# Patient Record
Sex: FEMALE | Race: WHITE | Employment: FULL TIME | URBAN - NONMETROPOLITAN AREA
[De-identification: names, ages, dates, MRNs, and addresses within clinical notes are randomized per-mention and may not be internally consistent; named-entity substitution may affect disease eponyms.]

---

## 2018-03-01 ENCOUNTER — HOSPITAL ENCOUNTER (OUTPATIENT)
Dept: WOMENS IMAGING | Age: 43
Discharge: HOME | End: 2018-03-01
Payer: COMMERCIAL

## 2018-03-01 DIAGNOSIS — Z12.31 VISIT FOR SCREENING MAMMOGRAM: ICD-10-CM

## 2018-03-01 PROCEDURE — 77063 BREAST TOMOSYNTHESIS BI: CPT

## 2018-05-22 ENCOUNTER — OFFICE VISIT (OUTPATIENT)
Dept: PULMONOLOGY | Age: 43
End: 2018-05-22
Payer: COMMERCIAL

## 2018-05-22 VITALS
BODY MASS INDEX: 40.05 KG/M2 | DIASTOLIC BLOOD PRESSURE: 86 MMHG | OXYGEN SATURATION: 98 % | SYSTOLIC BLOOD PRESSURE: 124 MMHG | HEART RATE: 87 BPM | WEIGHT: 249.2 LBS | HEIGHT: 66 IN

## 2018-05-22 DIAGNOSIS — Z99.89 OBSTRUCTIVE SLEEP APNEA ON CPAP: Primary | ICD-10-CM

## 2018-05-22 DIAGNOSIS — G47.33 OBSTRUCTIVE SLEEP APNEA ON CPAP: Primary | ICD-10-CM

## 2018-05-22 PROCEDURE — 99213 OFFICE O/P EST LOW 20 MIN: CPT | Performed by: PHYSICIAN ASSISTANT

## 2018-05-22 ASSESSMENT — ENCOUNTER SYMPTOMS
WHEEZING: 0
HEARTBURN: 0
ORTHOPNEA: 0
SORE THROAT: 0
SPUTUM PRODUCTION: 0
COUGH: 0
SINUS PAIN: 0
NAUSEA: 0
SHORTNESS OF BREATH: 0
GASTROINTESTINAL NEGATIVE: 1
RESPIRATORY NEGATIVE: 1
EYES NEGATIVE: 1

## 2019-07-19 ENCOUNTER — OFFICE VISIT (OUTPATIENT)
Dept: PULMONOLOGY | Age: 44
End: 2019-07-19
Payer: COMMERCIAL

## 2019-07-19 VITALS
DIASTOLIC BLOOD PRESSURE: 88 MMHG | SYSTOLIC BLOOD PRESSURE: 138 MMHG | RESPIRATION RATE: 16 BRPM | BODY MASS INDEX: 41.91 KG/M2 | WEIGHT: 260.8 LBS | OXYGEN SATURATION: 99 % | HEART RATE: 88 BPM | HEIGHT: 66 IN

## 2019-07-19 DIAGNOSIS — E66.9 OBESITY (BMI 30.0-34.9): ICD-10-CM

## 2019-07-19 DIAGNOSIS — G47.33 OBSTRUCTIVE SLEEP APNEA ON CPAP: Primary | ICD-10-CM

## 2019-07-19 DIAGNOSIS — Z99.89 OBSTRUCTIVE SLEEP APNEA ON CPAP: Primary | ICD-10-CM

## 2019-07-19 PROCEDURE — 99213 OFFICE O/P EST LOW 20 MIN: CPT | Performed by: PHYSICIAN ASSISTANT

## 2019-07-19 RX ORDER — OMEPRAZOLE 20 MG/1
20 CAPSULE, DELAYED RELEASE ORAL DAILY
COMMUNITY

## 2019-07-19 ASSESSMENT — ENCOUNTER SYMPTOMS
EYES NEGATIVE: 1
COUGH: 0
CHEST TIGHTNESS: 0
STRIDOR: 0
WHEEZING: 0
ALLERGIC/IMMUNOLOGIC NEGATIVE: 1
NAUSEA: 0
SHORTNESS OF BREATH: 0
DIARRHEA: 0
BACK PAIN: 0

## 2019-07-19 NOTE — PROGRESS NOTES
 Smokeless tobacco: Never Used   Substance Use Topics    Alcohol use: Yes     Alcohol/week: 0.0 standard drinks     Comment: social    Drug use: No       Allergies   Allergen Reactions    Molds & Smuts      Through allergy testing    Penicillins Other (See Comments)     Unsure of reaction-? Really allergic       Current Outpatient Medications   Medication Sig Dispense Refill    omeprazole (PRILOSEC) 20 MG delayed release capsule Take 20 mg by mouth daily      Rosuvastatin Calcium (CRESTOR PO) Take by mouth      levothyroxine (SYNTHROID) 125 MCG tablet Take 125 mcg by mouth Daily      fluticasone-salmeterol (ADVAIR DISKUS) 250-50 MCG/DOSE AEPB Inhale 1 puff into the lungs daily as needed.  Multiple Vitamins-Minerals (WOMENS ONE DAILY PO) Take 1 tablet by mouth daily.  norgestimate-ethinyl estradiol (SPRINTEC 28) 0.25-35 MG-MCG per tablet Take 1 tablet by mouth daily.  metformin (GLUCOPHAGE) 500 MG tablet Take 1,000 mg by mouth 2 times daily (with meals).  ferrous sulfate 325 (65 FE) MG tablet Take 325 mg by mouth daily (with breakfast).  Fe-Succ-C-Gqeg-A55-Prb Stomach (MULTIGEN PO) Take 1 tablet by mouth daily.  FLUoxetine (PROZAC) 20 MG capsule Take 20 mg by mouth daily.  Omega-3 Fatty Acids (FISH OIL) 1200 MG CAPS Take 1 tablet by mouth daily.  folic acid (FOLVITE) 155 MCG tablet Take 400 mcg by mouth daily.  ValACYclovir HCl (VALTREX PO) Take 1 tablet by mouth daily as needed.  Albuterol Sulfate (PROAIR HFA IN) Inhale 2 puffs into the lungs daily as needed. No current facility-administered medications for this visit.         Family History   Problem Relation Age of Onset    High Blood Pressure Father     High Cholesterol Father     Cancer Paternal Uncle         pancreatic    Diabetes Maternal Grandmother     Cancer Paternal Grandfather         pancreatic    Asthma Neg Hx     Kidney Disease Neg Hx         Review of Systems -   Review of Judgment and thought content normal.         ASSESSMENT/DIAGNOSIS     Diagnosis Orders   1. Obstructive sleep apnea on CPAP  DME Order for CPAP as OP    DME Order for Oral Appliance as OP   2. Obesity (BMI 30.0-34. 9)              Plan   Do you need any equipment today? Yes update supplies  - Refer for OA also to use when traveling  - She  was advised to continue current positive airway pressure therapy with above described pressure. - She  advised to keep goodcompliance with current recommended pressure to get optimal results and clinical improvement  - Recommend 7-9 hours of sleep with PAP  - She was advised to call DME company regarding supplies if needed.   -She call my office for earlier appointment if needed for worsening of sleep symptoms.   - She was instructed on weight loss  - Vitor Maloney was educated about my impression and plan. Patient verbalizesunderstanding.   We will see Josetta Leyden back in: 1 year with download    Information added by my medical assistant/LPN was reviewed today         Armando Mccord PA-C, MPAS  7/19/2019

## 2020-02-18 ENCOUNTER — HOSPITAL ENCOUNTER (OUTPATIENT)
Dept: WOMENS IMAGING | Age: 45
Discharge: HOME | End: 2020-02-18
Payer: COMMERCIAL

## 2020-02-18 PROCEDURE — 77063 BREAST TOMOSYNTHESIS BI: CPT

## 2020-02-26 ENCOUNTER — HOSPITAL ENCOUNTER (OUTPATIENT)
Dept: WOMENS IMAGING | Age: 45
Discharge: HOME | End: 2020-02-26
Payer: COMMERCIAL

## 2020-02-26 PROCEDURE — G0279 TOMOSYNTHESIS, MAMMO: HCPCS

## 2020-02-26 PROCEDURE — 76642 ULTRASOUND BREAST LIMITED: CPT

## 2020-02-27 ENCOUNTER — HOSPITAL ENCOUNTER (OUTPATIENT)
Dept: WOMENS IMAGING | Age: 45
Discharge: HOME | End: 2020-02-27
Payer: COMMERCIAL

## 2020-02-27 PROCEDURE — G0279 TOMOSYNTHESIS, MAMMO: HCPCS

## 2020-02-27 PROCEDURE — C1894 INTRO/SHEATH, NON-LASER: HCPCS

## 2020-02-27 PROCEDURE — 88305 TISSUE EXAM BY PATHOLOGIST: CPT

## 2020-02-27 PROCEDURE — 2709999900 HC NON-CHARGEABLE SUPPLY

## 2020-02-27 PROCEDURE — 19083 BX BREAST 1ST LESION US IMAG: CPT

## 2020-02-27 PROCEDURE — 76942 ECHO GUIDE FOR BIOPSY: CPT

## 2020-02-27 PROCEDURE — A4648 IMPLANTABLE TISSUE MARKER: HCPCS

## 2020-02-27 NOTE — PROGRESS NOTES
Formulation and discussion of sedation / procedure plans, risks, benefits, side effects and alternatives with patient and/or responsible adult completed.     Electronically signed by Keke Warner MD on 2/27/2020 at 1:06 PM

## 2020-07-17 ENCOUNTER — OFFICE VISIT (OUTPATIENT)
Dept: PULMONOLOGY | Age: 45
End: 2020-07-17
Payer: COMMERCIAL

## 2020-07-17 VITALS
DIASTOLIC BLOOD PRESSURE: 76 MMHG | SYSTOLIC BLOOD PRESSURE: 132 MMHG | BODY MASS INDEX: 43.23 KG/M2 | TEMPERATURE: 98.2 F | WEIGHT: 269 LBS | HEIGHT: 66 IN | HEART RATE: 77 BPM | OXYGEN SATURATION: 99 %

## 2020-07-17 PROCEDURE — 99213 OFFICE O/P EST LOW 20 MIN: CPT | Performed by: PHYSICIAN ASSISTANT

## 2020-07-17 ASSESSMENT — ENCOUNTER SYMPTOMS
BACK PAIN: 0
NAUSEA: 0
DIARRHEA: 0
COUGH: 0
SHORTNESS OF BREATH: 0
EYES NEGATIVE: 1
STRIDOR: 0
CHEST TIGHTNESS: 0
ALLERGIC/IMMUNOLOGIC NEGATIVE: 1
WHEEZING: 0

## 2020-09-14 ENCOUNTER — HOSPITAL ENCOUNTER (OUTPATIENT)
Dept: WOMENS IMAGING | Age: 45
Discharge: HOME | End: 2020-09-14
Payer: COMMERCIAL

## 2020-09-14 PROCEDURE — G0279 TOMOSYNTHESIS, MAMMO: HCPCS

## 2020-09-14 PROCEDURE — 76642 ULTRASOUND BREAST LIMITED: CPT

## 2020-10-30 ENCOUNTER — OFFICE VISIT (OUTPATIENT)
Dept: PULMONOLOGY | Age: 45
End: 2020-10-30
Payer: COMMERCIAL

## 2020-10-30 VITALS
BODY MASS INDEX: 41.75 KG/M2 | OXYGEN SATURATION: 99 % | SYSTOLIC BLOOD PRESSURE: 128 MMHG | DIASTOLIC BLOOD PRESSURE: 82 MMHG | WEIGHT: 259.8 LBS | HEIGHT: 66 IN | HEART RATE: 90 BPM

## 2020-10-30 PROCEDURE — 99213 OFFICE O/P EST LOW 20 MIN: CPT | Performed by: PHYSICIAN ASSISTANT

## 2020-10-30 ASSESSMENT — ENCOUNTER SYMPTOMS
EYES NEGATIVE: 1
ALLERGIC/IMMUNOLOGIC NEGATIVE: 1
SHORTNESS OF BREATH: 0
DIARRHEA: 0
CHEST TIGHTNESS: 0
NAUSEA: 0
WHEEZING: 0
COUGH: 0
STRIDOR: 0
BACK PAIN: 0

## 2020-10-30 NOTE — PROGRESS NOTES
[facility]      Memo Garcia         [id number]  10/30/2020   Chief Complaint   Patient presents with    Follow-up     GROVER 3 month follow up with a download        Pt of Dr. Claudy SEBASTIAN Download:   Original or initial AHI: 12     Date of initial study: 10/29/2013      Compliant  100%     Noncompliant 0 %     PAP Type CPAP Level  13   Avg Hrs/Day 8 hr 0 min  AHI: 0.8   Recorded compliance dates ,   9/28/20-10/27/20   Machine/Mfg:   [x] ResMed    [] Respironics/Dreamstation   Interface:   [] Nasal    [] Nasal pillows   [x] FFM      Provider:      [] [facility]     [x][facility]     [] [employer]    [] Evaline Colder    [] [facility]               [] [facility]      [] Adaptive    [] [address]      [] Other    Neck Size: 15  Mallampati Mallampati 3  ESS:    SAQLI:     Here is a scan of the most recent download:          Presentation:   Henry Farmer presents for sleep medicine follow up for obstructive sleep apnea  Since the last visit, Henry Farmer is doing well with new PAP. She is sleeping and feels rested. Equipment issues: The pressure is  acceptable, the mask is acceptable     Sleep issues:  Do you feel better? Yes  More rested? Yes   Better concentration? yes    Progress History:   Since last visit any new medical issues? No  New ER or hospitlal visits? No  Any new or changes in medicines? No  Any new sleep medicines? No        Past Medical History:   Diagnosis Date    Asthma     Heterozygous MTHFR mutation [id number] ([facility] [address]     has half gene from mother     Polycystic ovarian disease     Sleep apnea     Thyroid disease        Past Surgical History:   Procedure Laterality Date    [facility] Feb 10/2015    [facility]    BREAST SURGERY Left 2/26/15    Dr. Danii Lee Right 2011    UPPER GASTROINTESTINAL ENDOSCOPY  over 5 years ago ?        Social History     Tobacco Use    Smoking status: Never Smoker    Smokeless tobacco: Never Used   Substance Use Topics  Alcohol use: Yes     Alcohol/week: 0.0 standard drinks     Comment: social    Drug use: No       Allergies   Allergen Reactions    Molds & Smuts      Through allergy testing    Penicillins Other (See Comments)     Unsure of reaction-? Really allergic       Current Outpatient Medications   Medication Sig Dispense Refill    Cyanocobalamin (B-12 PO) Take by mouth      Cholecalciferol (RA VITAMIN D-3 PO) Take by mouth      omeprazole (PRILOSEC) 20 MG delayed release capsule Take 20 mg by mouth daily      Rosuvastatin Calcium (CRESTOR PO) Take by mouth      levothyroxine (SYNTHROID) 125 MCG tablet Take 125 mcg by mouth Daily      fluticasone-salmeterol (ADVAIR DISKUS) 250-50 MCG/DOSE AEPB Inhale 1 puff into the lungs daily as needed.  Multiple Vitamins-Minerals (WOMENS ONE DAILY PO) Take 1 tablet by mouth daily.  norgestimate-ethinyl estradiol (SPRINTEC 28) 0.25-35 MG-MCG per tablet Take 1 tablet by mouth daily.  metformin (GLUCOPHAGE) 500 MG tablet Take 1,000 mg by mouth 2 times daily (with meals).  ferrous sulfate 325 (65 FE) MG tablet Take 325 mg by mouth daily (with breakfast).  Fe-Succ-C-Iakr-N38-Ggb Stomach (MULTIGEN PO) Take 1 tablet by mouth daily.  FLUoxetine (PROZAC) 20 MG capsule Take 20 mg by mouth daily.  Omega-3 Fatty Acids (FISH OIL) 1200 MG CAPS Take 1 tablet by mouth daily.  folic acid (FOLVITE) 311 MCG tablet Take 400 mcg by mouth daily.  ValACYclovir HCl (VALTREX PO) Take 1 tablet by mouth daily as needed.  Albuterol Sulfate (PROAIR HFA IN) Inhale 2 puffs into the lungs daily as needed. No current facility-administered medications for this visit.         Family History   Problem Relation Age of Onset    High Blood Pressure Father     High Cholesterol Father     Cancer Paternal Uncle 76        pancreatic    Diabetes Maternal Grandmother     Cancer Paternal Grandfather [de-identified]        pancreatic    Cancer Maternal Aunt 78 pancreatic    Breast Cancer Paternal Cousin         >47    Cancer Paternal Uncle 76        pancreatic    Breast Cancer Paternal Cousin         >50    Asthma Neg Hx     Kidney Disease Neg Hx         Review of Systems -   Review of Systems   Constitutional: Negative for activity change, appetite change, chills and fever. HENT: Negative for congestion and postnasal drip. Eyes: Negative. Respiratory: Negative for cough, chest tightness, shortness of breath, wheezing and stridor. Cardiovascular: Negative for chest pain and leg swelling. Gastrointestinal: Negative for diarrhea and nausea. Endocrine: Negative. Genitourinary: Negative. Musculoskeletal: Negative. Negative for arthralgias and back pain. Skin: Negative. Allergic/Immunologic: Negative. Neurological: Negative. Negative for dizziness and light-headedness. Psychiatric/Behavioral: Negative. All other systems reviewed and are negative. Physical Exam:    BMI:  Body mass index is 42.58 kg/m². Wt Readings from Last 3 Encounters:   10/30/20 259 lb 12.8 oz (117.8 kg)   07/17/20 269 lb (122 kg)   07/19/19 260 lb 12.8 oz (118.3 kg)     Weight stable / unchanged  Vitals: /82 (Site: Left Upper Arm, Position: Sitting, Cuff Size: Large Adult)   Pulse 90   Ht 5' 5.5\" (1.664 m)   Wt 259 lb 12.8 oz (117.8 kg)   SpO2 99% Comment: on room air at rest  BMI 42.58 kg/m²       Physical Exam  Constitutional:       Appearance: She is well-developed. HENT:      Head: Normocephalic and atraumatic. Right Ear: External ear normal.      Left Ear: External ear normal.   Eyes:      Conjunctiva/sclera: Conjunctivae normal.      Pupils: Pupils are equal, round, and reactive to light. Neck:      Musculoskeletal: Normal range of motion and neck supple. Cardiovascular:      Rate and Rhythm: Normal rate and regular rhythm. Heart sounds: Normal heart sounds.    Pulmonary:      Effort: Pulmonary effort is normal.      Breath sounds: Normal breath sounds. Musculoskeletal: Normal range of motion. Skin:     General: Skin is warm and dry. Neurological:      Mental Status: She is alert and oriented to person, place, and time. Psychiatric:         Behavior: Behavior normal.         Thought Content: Thought content normal.         Judgment: Judgment normal.           ASSESSMENT/DIAGNOSIS     Diagnosis Orders   1. Obstructive sleep apnea on CPAP     2. Morbid obesity with BMI of 40.0-44.9, adult [facility])              Plan   Do you need any equipment today? Radha Atkinson reviewed and discussed with patient  - She  was advised to continue current positive airway pressure therapy with above described pressure. - She  advised to keep good compliance with current recommended pressure to get optimal results and clinical improvement  - Recommend 7-9 hours of sleep with PAP  - She was advised to call [employer] regarding supplies if needed.   -She call my office for earlier appointment if needed for worsening of sleep symptoms.   - She was instructed on weight loss  - Sushma Grafton was educated about my impression and plan. Patient verbalizesunderstanding.   We will see Hayes Chanel back in: 1 year with download    Information added by my medical assistant/LPN was reviewed today         Canelo Cotton PA-C, MPAS  10/30/2020

## 2021-02-25 ENCOUNTER — HOSPITAL ENCOUNTER (OUTPATIENT)
Dept: WOMENS IMAGING | Age: 46
Discharge: HOME | End: 2021-02-25
Payer: COMMERCIAL

## 2021-02-25 DIAGNOSIS — Z12.31 VISIT FOR SCREENING MAMMOGRAM: ICD-10-CM

## 2021-02-25 PROCEDURE — 77067 SCR MAMMO BI INCL CAD: CPT

## 2021-10-15 ENCOUNTER — OFFICE VISIT (OUTPATIENT)
Dept: PULMONOLOGY | Age: 46
End: 2021-10-15
Payer: COMMERCIAL

## 2021-10-15 VITALS
DIASTOLIC BLOOD PRESSURE: 84 MMHG | HEART RATE: 90 BPM | SYSTOLIC BLOOD PRESSURE: 132 MMHG | WEIGHT: 251.6 LBS | BODY MASS INDEX: 40.43 KG/M2 | HEIGHT: 66 IN | OXYGEN SATURATION: 99 % | TEMPERATURE: 97.8 F

## 2021-10-15 DIAGNOSIS — Z99.89 OBSTRUCTIVE SLEEP APNEA ON CPAP: Primary | ICD-10-CM

## 2021-10-15 DIAGNOSIS — E66.01 MORBID OBESITY WITH BMI OF 40.0-44.9, ADULT (HCC): ICD-10-CM

## 2021-10-15 DIAGNOSIS — G47.33 OBSTRUCTIVE SLEEP APNEA ON CPAP: Primary | ICD-10-CM

## 2021-10-15 PROCEDURE — 99213 OFFICE O/P EST LOW 20 MIN: CPT | Performed by: PHYSICIAN ASSISTANT

## 2021-10-15 RX ORDER — NALTREXONE HYDROCHLORIDE AND BUPROPION HYDROCHLORIDE 8; 90 MG/1; MG/1
TABLET, EXTENDED RELEASE ORAL
COMMUNITY
Start: 2021-10-12

## 2021-10-15 ASSESSMENT — ENCOUNTER SYMPTOMS
WHEEZING: 0
STRIDOR: 0
SHORTNESS OF BREATH: 0
DIARRHEA: 0
NAUSEA: 0
EYES NEGATIVE: 1
ALLERGIC/IMMUNOLOGIC NEGATIVE: 1
BACK PAIN: 0
CHEST TIGHTNESS: 0
COUGH: 0

## 2021-10-15 NOTE — PROGRESS NOTES
[facility]      Caseyeryn Park         [id number]  10/15/2021   Chief Complaint   Patient presents with    Follow-up     GROVER 1 year with download         Pt of Dr. Nj SEBASTIAN Download:   Carmenharriet Grimm or initial AHI: 12.0     Date of initial study: 10/29/2013      Compliant  100%     Noncompliant 0 %     PAP Type Airsense 10 autoset Level  13   Avg Hrs/Day 7 hr 54 min  AHI: 0.8   Recorded compliance dates , 9/4/2021  to 10/3/2021   Machine/Mfg:   [x] ResMed    [] Respironics/Dreamstation   Interface:   [] Nasal    [] Nasal pillows   [x] FFM      Provider:      [] SR-HME     [x][facility]     [] [employer]    [] Nicole Shipman    [] [facility]               [] [facility] [facility]      [] Adaptive    [] [address]      [] Other    Neck Size: 15  Mallampati Mallampati 3  ESS:  8  SAQLI: 85    Here is a scan of the most recent download:            Presentation:   Alexander Villalta presents for sleep medicine follow up for obstructive sleep apnea  Since the last visit, Alexander Villalta is doing well with PAP. She is sleeping well and feels rested. Equipment issues: The pressure is  acceptable, the mask is acceptable     Sleep issues:  Do you feel better? Yes  More rested? Yes   Better concentration? yes    Progress History:   Since last visit any new medical issues? No  New ER or hospital visits? No  Any new or changes in medicines? No  Any new sleep medicines? No    Review of Systems -   Review of Systems   Constitutional: Negative for activity change, appetite change, chills and fever. HENT: Negative for congestion and postnasal drip. Eyes: Negative. Respiratory: Negative for cough, chest tightness, shortness of breath, wheezing and stridor. Cardiovascular: Negative for chest pain and leg swelling. Gastrointestinal: Negative for diarrhea and nausea. Endocrine: Negative. Genitourinary: Negative. Musculoskeletal: Negative. Negative for arthralgias and back pain. Skin: Negative. pressure. - She  advised to keep good compliance with current recommended pressure to get optimal results and clinical improvement  - Recommend 7-9 hours of sleep with PAP  - She was advised to call [employer] company regarding supplies if needed.   -She call my office for earlier appointment if needed for worsening of sleep symptoms.   - She was instructed on weight loss  - Kendrick Whitten was educated about my impression and plan. Patient verbalizesunderstanding.   We will see Divya Salomon back in: 1 year with download    Information added by my medical assistant/LPN was reviewed today          Dylon Ibrahim PA-C, MYA  10/15/2021

## 2022-03-15 ENCOUNTER — HOSPITAL ENCOUNTER (OUTPATIENT)
Dept: MAMMOGRAPHY | Age: 47
Discharge: HOME | End: 2022-03-15
Payer: COMMERCIAL

## 2022-03-15 DIAGNOSIS — Z12.31 VISIT FOR SCREENING MAMMOGRAM: ICD-10-CM

## 2022-03-15 DIAGNOSIS — Z12.31 SCREENING MAMMOGRAM FOR BREAST CANCER: ICD-10-CM

## 2022-03-15 PROCEDURE — 77063 BREAST TOMOSYNTHESIS BI: CPT

## 2022-09-26 ENCOUNTER — OFFICE VISIT (OUTPATIENT)
Dept: PHYSICAL MEDICINE AND REHAB | Age: 47
End: 2022-09-26
Payer: COMMERCIAL

## 2022-09-26 VITALS
BODY MASS INDEX: 39.05 KG/M2 | HEIGHT: 66 IN | WEIGHT: 243 LBS | DIASTOLIC BLOOD PRESSURE: 90 MMHG | SYSTOLIC BLOOD PRESSURE: 130 MMHG

## 2022-09-26 DIAGNOSIS — M47.816 LUMBAR SPONDYLOSIS: ICD-10-CM

## 2022-09-26 DIAGNOSIS — G89.4 CHRONIC PAIN SYNDROME: ICD-10-CM

## 2022-09-26 DIAGNOSIS — M47.816 FACET HYPERTROPHY OF LUMBAR REGION: Primary | ICD-10-CM

## 2022-09-26 DIAGNOSIS — M53.3 SACROILIAC JOINT PAIN: ICD-10-CM

## 2022-09-26 DIAGNOSIS — M53.3 SI (SACROILIAC) JOINT DYSFUNCTION: ICD-10-CM

## 2022-09-26 PROCEDURE — 99204 OFFICE O/P NEW MOD 45 MIN: CPT | Performed by: NURSE PRACTITIONER

## 2022-09-26 RX ORDER — METOPROLOL TARTRATE 50 MG/1
50 TABLET, FILM COATED ORAL 2 TIMES DAILY
COMMUNITY

## 2022-09-26 RX ORDER — DIPHENHYDRAMINE HCL 25 MG
25 TABLET ORAL EVERY 6 HOURS PRN
COMMUNITY

## 2022-09-26 RX ORDER — NAPROXEN 500 MG/1
500 TABLET ORAL 2 TIMES DAILY WITH MEALS
COMMUNITY

## 2022-09-26 NOTE — PATIENT INSTRUCTIONS
Learning About Medial Branch Block and Neurotomy  What are medial branch block and neurotomy? Facet joints connect your vertebrae to each other. Problems in these joints can cause chronic (long-term) pain in the neck or back. Medial branch nerves are the nerves that carry many of the pain messages from your facet joints. Radiofrequency medial branch neurotomy is a type of medial branch neurotomy that is used to relieve arthritis pain. It uses radio waves to damage nerves in your neck or back so that they can no longer send pain messages to your brain. Before your doctor knows if a neurotomy will help you, you will get a medial branch block to find out if certain nerves are the ones that are a source of your pain. You will need two separate visits to the outpatient center or hospital to have both procedures. You will need someone to drive you home. How is a medial branch block done? The doctor will use a tiny needle to numb the skin where you will get the block. Then the doctor puts the block needle into the numbed area. You may feel some pressure, but you should not feel pain. Using fluoroscopy (live X-ray) to guide the needle, the doctor injects medicine onto one or more nerves to make them numb. If you get relief from your pain in the next 4 to 6 hours, it's a sign that those nerves may be contributing to your pain. The relief will last only a short time. You may then have a medial branch neurotomy at a later visit to try to get longer relief. How is a medial branch neurotomy done? The doctor will use a tiny needle to numb the skin where you will get the neurotomy. Then the doctor puts the neurotomy needle into the numbed area. You may feel some pressure. Using fluoroscopy (live X-ray) to guide the needle, the doctor sends radio waves through the needle to the nerve for 60 to 90 seconds. The radio waves heat the nerve, which damages it. The doctor may do this several times.  And more than one nerve may be treated. How long do medial branch block and neurotomy take? It takes 20 to 30 minutes to get the block. You can go home after the doctor watches you for about an hour. It takes 45 to 90 minutes to get a neurotomy, depending on how many nerves are heated. You will probably go home 30 to 60 minutes after the procedure. What can you expect after a neurotomy? You will get instructions on how to report how much pain you have when you are at home. You may feel a little sore or tender at the injection site at first. But after a successful neurotomy, most people have pain relief right away. It often lasts for several months, but your pain may come back. If your pain does come back, it may mean that the damaged nerve has healed and can send pain messages again. Or it can mean that a different nerve is causing pain. Your doctor will discuss your options with you. Follow-up care is a key part of your treatment and safety. Be sure to make and go to all appointments, and call your doctor if you are having problems. It's also a good idea to know your test results and keep a list of the medicines you take. Where can you learn more? Go to https://Instabug.[email or web address] org and sign in to your [employer] account. Enter Y395 in the [facility] box to learn more about \"Learning About Medial Branch Block and Neurotomy. \"     If you do not have an account, please click on the \"Sign Up Now\" link. Current as of: April 8, 2021               Content Version: 13.1  © 2006-2021 [employer]. Care instructions adapted under license by [facility] ([facility]). If you have questions about a medical condition or this instruction, always ask your healthcare professional. [place] [address] any warranty or liability for your use of this information.

## 2022-09-26 NOTE — PROGRESS NOTES
Chronic Pain/PM&R Clinic Note     Encounter Date: 22    Subjective:   Chief Complaint:   Chief Complaint   Patient presents with    New Patient     Low back pain        History of Present Illness:   Mookie Johnson is a 48 y.o. female seen in the clinic initially on 2022 upon request from Dr Gilbert Cervantes for her history of SI, low back pain. She has a personal medical history of GROVER, asthma, obesity, pre-diabetic, anxiety, depression. Patient presents with complaints of bilateral low back pain, right is the worst, does go into the buttocks and occasionally does have some thigh pain. She denies pain going into the legs or feet. She describes the pain as a constant dull ache. She reports pain for 18 + months. She fell on the ice in , no fractures that she knew of, and no significant back pain at that time. Then she went to [address] [place] [address], walked a lot, and then noticed the back pain. She has tried chiropractor, massage, physical therapy with relief, OTCs, topicals with mild relief. Pain is increased walking, standing, up.down stairs, change in position, sitting to long. Pain is alleviated by heat, naproxen. Mornings are the worst, she feels stiff but pain does continue throughout the day. She tries to do exercises at home, but minimally. 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 Denies any numbness, tingling, loss of bowel or bladder. She reports [facility] did give her a intramuscular injection with no relief. Pain scale : at worst pain is 9/10, at best pain is 2/10. History of Interventions:   Surgery: No previous lumbar surgeries  Injections: None    Current Treatment Medications:   Naproxen- relief    Historical Treatment Medications:   Tylenol- with relief  Advil- with relief    Imaging:    Ordering Provider: Cee Crawford          Radiology Department  Patient:  Cy Scheuermann [address]   : 1975   Sex: Kris, Tawnya [facility]  Location:  [address] [place] [address]   Unit #:   [id number]       Acct #:  [de-identified]       Ordering Phys: Aj Godoy PA-C            Exam Date: 06/20/22     Accession #:  [id number]     Exam:  MRI   MRI Lumbar Spine w/o Contrast     Result: See Report            EXAM:  MR LUMBAR SPINE WITHOUT INTRAVENOUS CONTRAST          CLINICAL INDICATION:  LOW BACK PAIN X1 YEAR -- ASSOCIATED RIGHT HIP PAIN     -- FALL INJURY FEB 2021          TECHNIQUE:  Multiplanar and multisequence MR images of the lumbar spine     without intravenous contrast.  This report was created using [employer]     report generation technology. COMPARISON:  None. FINDINGS:          VERTEBRAE:  Unremarkable. Vertebral body heights are preserved. Normal     vertebral bodies and posterior elements. Normal alignment. No     spondylolisthesis. There is preservation of the normal lumbar lordosis. SPINAL CORD:  Unremarkable. Normal position and signal intensity of the     conus medullaris. SOFT TISSUES:  Unremarkable. DISCS/SPINAL CANAL/NEURAL FORAMINA:          L1-L2:  Unremarkable. Normal disc height and morphology. Normal spinal     canal and lateral recess. Normal neuroforamina. L2-L3:  Unremarkable. Normal disc height and morphology. Normal spinal     canal and lateral recesses. Normal neuroforamina. L3-L4:  Unremarkable. Normal disc height and morphology. Facet     arthropathy causes mild impression on the thecal sac but no significant     spinal stenosis. Normal neuroforamina. L4-L5:  Unremarkable. Normal disc height and morphology. Facet     arthropathy causes mild impression on thecal sac but no significant spinal     stenosis. Normal neuroforamina. L5-S1:  Unremarkable. Normal disc height and morphology. Facet     arthropathy causes mild impression on the thecal sac but no significant     spinal stenosis. Normal neuroforamina. IMPRESSION:     Facet arthropathy at L3-4, L4-5 and L5-S1 minor impression on the thecal     sac. Otherwise normal MRI lumbar spine. Electronically Signed:     Bennett Shahid. MD [place]     2022/06/20 at 14:38 EDT     Reading Location ID and State: [id number]     Tel [phone], Service support  [phone], Fax [phone]                 Past Medical History:   Diagnosis Date    Asthma     Heterozygous MTHFR mutation [id number]     has half gene from mother     Polycystic ovarian disease     Sleep apnea     Thyroid disease        Past Surgical History:   Procedure Laterality Date    BREAST SURGERY Left 2/26/15    Dr. Catarina Chinchilla  over 5 years ago ? US BREAST NEEDLE BIOPSY LEFT Left Feb 10/2015    [facility]    US BREAST NEEDLE BIOPSY RIGHT Right 2020       Family History   Problem Relation Age of Onset    High Blood Pressure Father     High Cholesterol Father     Cancer Paternal Uncle 76        pancreatic    Diabetes Maternal Grandmother     Cancer Paternal Grandfather [de-identified]        pancreatic    Cancer Maternal Aunt 79        pancreatic    Breast Cancer Paternal Cousin         >50    Cancer Paternal Uncle 76        pancreatic    Breast Cancer Paternal Cousin         >50    Asthma Neg Hx     Kidney Disease Neg Hx          Medications & Allergies:   Current Outpatient Medications   Medication Instructions    Albuterol Sulfate (PROAIR HFA IN) 2 puffs, Inhalation, DAILY PRN    Cholecalciferol (RA VITAMIN D-3 PO) Oral    CONTRAVE 8-90 MG per extended release tablet No dose, route, or frequency recorded.     Cyanocobalamin (B-12 PO) Oral    diphenhydrAMINE (BENADRYL) 25 mg, Oral, EVERY 6 HOURS PRN    ferrous sulfate (IRON 325) 325 mg, DAILY WITH BREAKFAST    FLUoxetine (PROZAC) 40 mg, Oral, DAILY,      fluticasone-salmeterol (ADVAIR) 250-50 MCG/DOSE AEPB 1 puff, Inhalation, DAILY PRN    folic acid (FOLVITE) 999 mcg, Oral, DAILY,      levothyroxine (SYNTHROID) 150 mcg, Oral, DAILY    metFORMIN (GLUCOPHAGE) 1,000 mg, Oral, 2 TIMES DAILY WITH MEALS    metoprolol tartrate (LOPRESSOR) 50 mg, Oral, 2 TIMES DAILY    Multiple Vitamins-Minerals (WOMENS ONE DAILY PO) 1 tablet, DAILY    naproxen (NAPROSYN) 500 mg, Oral, 2 TIMES DAILY WITH MEALS    norgestimate-ethinyl estradiol (ORTHO-CYCLEN) 0.25-35 MG-MCG per tablet 1 tablet, DAILY    Omega-3 Fatty Acids (FISH OIL) 1200 MG CAPS 1 tablet, Oral, DAILY    omeprazole (PRILOSEC) 20 mg, Oral, DAILY    Rosuvastatin Calcium (CRESTOR PO) 5 mg, Oral    ValACYclovir HCl (VALTREX PO) 1 tablet, Oral, DAILY PRN       Allergies   Allergen Reactions    Molds & Smuts      Through allergy testing    Penicillins Other (See Comments)     Unsure of reaction-? Really allergic       Review of Systems:   Constitutional: negative for weight changes or fevers  Genitourinary: negative for bowel/bladder incontinence   Musculoskeletal: positive for low back pain, SI pain  Neurological: negative for any leg weakness or numbness/tingling  Behavioral/Psych: negative for anxiety/depression   All other systems reviewed and are negative    Objective:     Vitals:    09/26/22 0804   BP: (!) 130/90     Constitutional: Pleasant, no acute distress   Head: Normocephalic, atraumatic   Eyes: Conjunctivae normal   Neck: Supple, symmetrical   Lungs: Normal respiratory effort, non-labored breathing   Cardiovascular: Limbs warm and well perfused   Abdomen: Non-protruded   Musculoskeletal: Muscle bulk symmetric, no atrophy, no gross deformities   · Lumbar Spine: ROM WNL. Lumbar paraspinals non-tender to palpation bilaterally. SLR neg bilaterally. ROSEY pos bilaterally. GAENSLEN pos bilaterally. Positive facet loading bilaterally. Neurological: Cranial nerves II-XII grossly intact. · Gait - Normal, non-antalgic gait. Ambulates without assistive device.    · Motor: 5/5 muscle strength in bilateral hip flexion, knee flexion, knee extension, ankle dorsiflexion, and ankle plantar flexion   · Sensory: LT sensation intact in lower limbs   · Reflexes: 2+ symmetrical in bilateral achilles, 2+ bilateral patellar  Skin: No rashes or lesions present   Psychological: Cooperative, no exaggerated pain behaviors       Assessment:    Diagnosis Orders   1. Facet hypertrophy of lumbar region  CHG FLUOR NEEDLE/CATH SPINE/PARASPINAL DX/THER ADDON    AK INJ DX/THER AGNT PARAVERT FACET JOINT, LUMBAR/SAC, 1ST LEVEL    AK INJ DX/THER AGNT PARAVERT FACET JOINT, LUMBAR/SAC, 2ND LEVEL      2. Lumbar spondylosis  CHG FLUOR NEEDLE/CATH SPINE/PARASPINAL DX/THER ADDON    AK INJ DX/THER AGNT PARAVERT FACET JOINT, LUMBAR/SAC, 1ST LEVEL    AK INJ DX/THER AGNT PARAVERT FACET JOINT, LUMBAR/SAC, 2ND LEVEL      3. Sacroiliac joint pain        4. SI (sacroiliac) joint dysfunction        5. Chronic pain syndrome             Mali Weber is a 48 y. o.female presenting to the pain clinic for evaluation of low back pain, SI pain. She has noted axial lumbar facet mediated pain on exam and with voiced complaints. Discussed procedures to assist with pain control, Lumbar facet series targeting bilateral facet levels of L4-5,L5-S1. She would like to proceed with this series, with plans for radiofrequency ablation in the future. She can continue Naproxen, OTC Tylneol, ice, heat, topicals, stretches and educated on home exercises. I will see her back after procedures. Plan: The following treatment recommendations and plan were discussed in detail with Mali Weber. Imaging:   I have reviewed patients imaging of lumbar XR, Lumbar MRI and results were discussed with patient today. Analgesics:    Patient is taking Acetaminophen. Patient informed that the maximum amount of acetaminophen taken on a regular basis should only be 4000 mg per day. Patient is taking naproxen. Patient is advised to take as prescribed and not take on an empty stomach.      Adjuvants: None    Interventions: In presence of lumbar axial back pain and with physical exam consistent for facetal pain, the option of medial branch blocks targeting the facet joints at bilateral L4-5,L5-S1 was chosen. The risks and benefits were discussed in detail with the patient. Patient wants to proceed with the injection with Dr Tiff Vasquez    Anticoagulation/NPO Recommendations:   Patient does need to hold any medications prior to the procedure, Naproxen, NSAIDs x 4 days  Patient does not need to be NPO prior to the procedure. LOCAL only     Multidisciplinary Pain Management:   In the presence of complex, chronic, and multi-factorial pain, the importance of a multidisciplinary approach to pain management in the patients management regimen was emphasized and discussed in great detail. OBESITY: Patient is advised to seek nutrition consult to help in managing their weight to decrease its impact on pain. The patient was also advised to continue physical therapy and stretching exercises at home and cognitive behavioral and/or group therapy. Referrals:  None    Prescriptions Written This Visit:   None    Follow-up:   After procedures     It was my pleasure to evaluate Deric Campbell today. I spent over 50 minutes evaluating this patient, reviewing previous notes and images and completing documentation.        German Mcgovern, APRN - CNP   Interventional Pain Management/PM&R   [place]

## 2022-10-13 ENCOUNTER — PREP FOR PROCEDURE (OUTPATIENT)
Dept: PHYSICAL MEDICINE AND REHAB | Age: 47
End: 2022-10-13

## 2022-10-18 NOTE — DISCHARGE INSTRUCTIONS

## 2022-10-18 NOTE — H&P
Today, patient presents for planned medial branch blocks targeting the facet joints at bilateral L4-5,L5-S1    This note is reflective of the patient's previous visit for evaluation. We will proceed with today's planned procedure. Since patient's last visit for evaluation, there have been no interval changes in medical history. Patient has no new numbness, weakness, or focal neurological deficit since evaluation. Patient has no contraindications to injection (no anticoagulation or recent antibiotic intake for active infections), and has a  present or is able to drive themselves (as discussed and cleared by physician). Allergies to latex, contrast dye, and steroid medications have been confirmed with the patient prior to the procedure. NPO necessity has been assessed and accepted based on procedure complexity. The risks and benefits of the procedure have been explained including but are not limited to infection, bleeding, paralysis, immediate post procedure weakness, and dizziness; the patient acknowledges understanding and desires to proceed with the procedure. Patient has signed consent for same procedure as discussed in previous clinic encounter. All other questions and concerns were addressed at bedside. See procedure note for full details. Post procedure Instructions: The patient was advised not to drive during the day of the procedure and not to engage in any significant decision making (unless otherwise states by physician). The patient was also advised to be cautious with walking/activity for 24 hours following today's visit and asked not to engage in over-exertion (unless otherwise states by physician). After this time, it is ok to resume pre-procedure level of activity. Patient advised to apply ice to site of injection in situations of pain and discomfort. Patient advised to not submerge site of injection during bath or pool activities for approximately 24 hours post-procedure.  Patient attested to understanding post procedure directions / restrictions. All other questions and concerns addressed before patient discharge in ambulatory fashion. Chronic Pain/PM&R Clinic Note     Encounter Date: 9/26/22    Subjective:   Chief Complaint:   No chief complaint on file. History of Present Illness:   Ashly Landin is a 48 y.o. female seen in the clinic initially on 09/26/2022 upon request from Dr Dino Bar for her history of SI, low back pain. She has a personal medical history of GROVER, asthma, obesity, pre-diabetic, anxiety, depression. Patient presents with complaints of bilateral low back pain, right is the worst, does go into the buttocks and occasionally does have some thigh pain. She denies pain going into the legs or feet. She describes the pain as a constant dull ache. She reports pain for 18 + months. She fell on the ice in 2021, no fractures that she knew of, and no significant back pain at that time. Then she went to [facility], walked a lot, and then noticed the back pain. She has tried chiropractor, massage, physical therapy with relief, OTCs, topicals with mild relief. Pain is increased walking, standing, up.down stairs, change in position, sitting to long. Pain is alleviated by heat, naproxen. Mornings are the worst, she feels stiff but pain does continue throughout the day. She tries to do exercises at home, but minimally. 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 Denies any numbness, tingling, loss of bowel or bladder. She reports [facility] did give her a intramuscular injection with no relief. Pain scale : at worst pain is 9/10, at best pain is 2/10.      History of Interventions:   Surgery: No previous lumbar surgeries  Injections: None    Current Treatment Medications:   Naproxen- relief    Historical Treatment Medications:   Tylenol- with relief  Advil- with relief    Imaging:    Ordering Provider: Tiffanie Meza [address] [facility]          Radiology Department  Patient:  Shruthi Conrad Aliciaburgisrael. :  1975   Sex: Kris, [address] [facility] [place]  Location:  [address] [place] [address]   Unit #:   [id number]       Acct #:  [de-identified]       Ordering Phys: Raymond Burns PA-C            Exam Date: 22     Accession #:  [id number]     Exam:  MRI   MRI Lumbar Spine w/o Contrast     Result: See Report            EXAM:  MR LUMBAR SPINE WITHOUT INTRAVENOUS CONTRAST          CLINICAL INDICATION:  LOW BACK PAIN X1 YEAR -- ASSOCIATED RIGHT HIP PAIN     -- FALL INJURY 2021          TECHNIQUE:  Multiplanar and multisequence MR images of the lumbar spine     without intravenous contrast.  This report was created using [employer]     report generation technology. COMPARISON:  None. FINDINGS:          VERTEBRAE:  Unremarkable. Vertebral body heights are preserved. Normal     vertebral bodies and posterior elements. Normal alignment. No     spondylolisthesis. There is preservation of the normal lumbar lordosis. SPINAL CORD:  Unremarkable. Normal position and signal intensity of the     conus medullaris. SOFT TISSUES:  Unremarkable. DISCS/SPINAL CANAL/NEURAL FORAMINA:          L1-L2:  Unremarkable. Normal disc height and morphology. Normal spinal     canal and lateral recess. Normal neuroforamina. L2-L3:  Unremarkable. Normal disc height and morphology. Normal spinal     canal and lateral recesses. Normal neuroforamina. L3-L4:  Unremarkable. Normal disc height and morphology. Facet     arthropathy causes mild impression on the thecal sac but no significant     spinal stenosis. Normal neuroforamina. L4-L5:  Unremarkable. Normal disc height and morphology. Facet     arthropathy causes mild impression on thecal sac but no significant spinal     stenosis. Normal neuroforamina. L5-S1:  Unremarkable.   Normal disc height and morphology. Facet     arthropathy causes mild impression on the thecal sac but no significant     spinal stenosis. Normal neuroforamina. IMPRESSION:     Facet arthropathy at L3-4, L4-5 and L5-S1 minor impression on the thecal     sac. Otherwise normal MRI lumbar spine. Electronically Signed:     Indra Briggs MD     2022/06/20 at 14:38 EDT     Reading Location ID and State: [place]     Tel [phone], Service support  [phone], Fax [phone]                 Past Medical History:   Diagnosis Date    Asthma     Heterozygous MTHFR mutation [id number]     has half gene from mother     Polycystic ovarian disease     Sleep apnea     Thyroid disease        Past Surgical History:   Procedure Laterality Date    BREAST SURGERY Left 2/26/15    Dr. Connor Menon  over 5 years ago ? US BREAST NEEDLE BIOPSY LEFT Left Feb 10/2015    [facility]    US BREAST NEEDLE BIOPSY RIGHT Right 2020       Family History   Problem Relation Age of Onset    High Blood Pressure Father     High Cholesterol Father     Cancer Paternal Uncle 76        pancreatic    Diabetes Maternal Grandmother     Cancer Paternal Grandfather [de-identified]        pancreatic    Cancer Maternal Aunt 79        pancreatic    Breast Cancer Paternal Cousin         >50    Cancer Paternal Uncle 76        pancreatic    Breast Cancer Paternal Cousin         >50    Asthma Neg Hx     Kidney Disease Neg Hx          Medications & Allergies:   Current Outpatient Medications   Medication Instructions    Albuterol Sulfate (PROAIR HFA IN) 2 puffs, Inhalation, DAILY PRN    Cholecalciferol (RA VITAMIN D-3 PO) Oral    CONTRAVE 8-90 MG per extended release tablet No dose, route, or frequency recorded.     Cyanocobalamin (B-12 PO) Oral    diphenhydrAMINE (BENADRYL) 25 mg, Oral, EVERY 6 HOURS PRN    ferrous sulfate (IRON 325) 325 mg, DAILY WITH BREAKFAST    FLUoxetine (PROZAC) 40 mg, Oral, DAILY, fluticasone-salmeterol (ADVAIR) 250-50 MCG/DOSE AEPB 1 puff, Inhalation, DAILY PRN    folic acid (FOLVITE) 922 mcg, Oral, DAILY,      levothyroxine (SYNTHROID) 150 mcg, Oral, DAILY    metFORMIN (GLUCOPHAGE) 1,000 mg, Oral, 2 TIMES DAILY WITH MEALS    metoprolol tartrate (LOPRESSOR) 50 mg, Oral, 2 TIMES DAILY    Multiple Vitamins-Minerals (WOMENS ONE DAILY PO) 1 tablet, DAILY    naproxen (NAPROSYN) 500 mg, Oral, 2 TIMES DAILY WITH MEALS    norgestimate-ethinyl estradiol (ORTHO-CYCLEN) 0.25-35 MG-MCG per tablet 1 tablet, DAILY    Omega-3 Fatty Acids (FISH OIL) 1200 MG CAPS 1 tablet, Oral, DAILY    omeprazole (PRILOSEC) 20 mg, Oral, DAILY    Rosuvastatin Calcium (CRESTOR PO) 5 mg, Oral    ValACYclovir HCl (VALTREX PO) 1 tablet, Oral, DAILY PRN       Allergies   Allergen Reactions    Molds & Smuts      Through allergy testing    Penicillins Other (See Comments)     Unsure of reaction-? Really allergic       Review of Systems:   Constitutional: negative for weight changes or fevers  Genitourinary: negative for bowel/bladder incontinence   Musculoskeletal: positive for low back pain, SI pain  Neurological: negative for any leg weakness or numbness/tingling  Behavioral/Psych: negative for anxiety/depression   All other systems reviewed and are negative    Objective: There were no vitals filed for this visit. Constitutional: Pleasant, no acute distress   Head: Normocephalic, atraumatic   Eyes: Conjunctivae normal   Neck: Supple, symmetrical   Lungs: Normal respiratory effort, non-labored breathing   Cardiovascular: Limbs warm and well perfused   Abdomen: Non-protruded   Musculoskeletal: Muscle bulk symmetric, no atrophy, no gross deformities   · Lumbar Spine: ROM WNL. Lumbar paraspinals non-tender to palpation bilaterally. SLR neg bilaterally. ROSEY pos bilaterally. GAENSLEN pos bilaterally. Positive facet loading bilaterally. Neurological: Cranial nerves II-XII grossly intact. · Gait - Normal, non-antalgic gait. Ambulates without assistive device. · Motor: 5/5 muscle strength in bilateral hip flexion, knee flexion, knee extension, ankle dorsiflexion, and ankle plantar flexion   · Sensory: LT sensation intact in lower limbs   · Reflexes: 2+ symmetrical in bilateral achilles, 2+ bilateral patellar  Skin: No rashes or lesions present   Psychological: Cooperative, no exaggerated pain behaviors       Assessment:    Diagnosis Orders   1. Facet hypertrophy of lumbar region  CHG FLUOR NEEDLE/CATH SPINE/PARASPINAL DX/THER ADDON    TN INJ DX/THER AGNT PARAVERT FACET JOINT, LUMBAR/SAC, 1ST LEVEL    TN INJ DX/THER AGNT PARAVERT FACET JOINT, LUMBAR/SAC, 2ND LEVEL      2. Lumbar spondylosis  CHG FLUOR NEEDLE/CATH SPINE/PARASPINAL DX/THER ADDON    TN INJ DX/THER AGNT PARAVERT FACET JOINT, LUMBAR/SAC, 1ST LEVEL    TN INJ DX/THER AGNT PARAVERT FACET JOINT, LUMBAR/SAC, 2ND LEVEL      3. Sacroiliac joint pain        4. SI (sacroiliac) joint dysfunction        5. Chronic pain syndrome             Johnny Magaña is a 48 y. o.female presenting to the pain clinic for evaluation of low back pain, SI pain. She has noted axial lumbar facet mediated pain on exam and with voiced complaints. Discussed procedures to assist with pain control, Lumbar facet series targeting bilateral facet levels of L4-5,L5-S1. She would like to proceed with this series, with plans for radiofrequency ablation in the future. She can continue Naproxen, OTC Tylneol, ice, heat, topicals, stretches and educated on home exercises. I will see her back after procedures. Plan: The following treatment recommendations and plan were discussed in detail with Johnny Magaña. Imaging:   I have reviewed patients imaging of lumbar XR, Lumbar MRI and results were discussed with patient today. Analgesics:    Patient is taking Acetaminophen. Patient informed that the maximum amount of acetaminophen taken on a regular basis should only be 4000 mg per day.      Patient is taking naproxen. Patient is advised to take as prescribed and not take on an empty stomach. Adjuvants:   None    Interventions: In presence of lumbar axial back pain and with physical exam consistent for facetal pain, the option of medial branch blocks targeting the facet joints at bilateral L4-5,L5-S1 was chosen. The risks and benefits were discussed in detail with the patient. Patient wants to proceed with the injection with Dr Mei Bee    Anticoagulation/NPO Recommendations:   Patient does need to hold any medications prior to the procedure, Naproxen, NSAIDs x 4 days  Patient does not need to be NPO prior to the procedure. LOCAL only     Multidisciplinary Pain Management:   In the presence of complex, chronic, and multi-factorial pain, the importance of a multidisciplinary approach to pain management in the patients management regimen was emphasized and discussed in great detail. OBESITY: Patient is advised to seek nutrition consult to help in managing their weight to decrease its impact on pain. The patient was also advised to continue physical therapy and stretching exercises at home and cognitive behavioral and/or group therapy. Referrals:  None    Prescriptions Written This Visit:   None    Follow-up:   After procedures     It was my pleasure to evaluate Morene Cease today. I spent over 50 minutes evaluating this patient, reviewing previous notes and images and completing documentation.        Aaron Barraza, DO   Interventional Pain Management/PM&R   [place]

## 2022-10-19 ENCOUNTER — APPOINTMENT (OUTPATIENT)
Dept: GENERAL RADIOLOGY | Age: 47
End: 2022-10-19
Attending: ANESTHESIOLOGY
Payer: COMMERCIAL

## 2022-10-19 ENCOUNTER — HOSPITAL ENCOUNTER (OUTPATIENT)
Age: 47
Setting detail: OUTPATIENT SURGERY
Discharge: HOME | End: 2022-10-19
Attending: ANESTHESIOLOGY | Admitting: ANESTHESIOLOGY
Payer: COMMERCIAL

## 2022-10-19 ENCOUNTER — TELEPHONE (OUTPATIENT)
Dept: PHYSICAL MEDICINE AND REHAB | Age: 47
End: 2022-10-19

## 2022-10-19 VITALS
RESPIRATION RATE: 18 BRPM | DIASTOLIC BLOOD PRESSURE: 80 MMHG | HEIGHT: 66 IN | BODY MASS INDEX: 37.61 KG/M2 | WEIGHT: 234 LBS | SYSTOLIC BLOOD PRESSURE: 122 MMHG | TEMPERATURE: 97.2 F | OXYGEN SATURATION: 95 % | HEART RATE: 71 BPM

## 2022-10-19 LAB — PREGNANCY, URINE: NEGATIVE

## 2022-10-19 PROCEDURE — 3600000056 HC PAIN LEVEL 4 BASE: Performed by: ANESTHESIOLOGY

## 2022-10-19 PROCEDURE — 64493 INJ PARAVERT F JNT L/S 1 LEV: CPT | Performed by: ANESTHESIOLOGY

## 2022-10-19 PROCEDURE — 81025 URINE PREGNANCY TEST: CPT

## 2022-10-19 PROCEDURE — 64494 INJ PARAVERT F JNT L/S 2 LEV: CPT | Performed by: ANESTHESIOLOGY

## 2022-10-19 PROCEDURE — 3209999900 FLUORO FOR SURGICAL PROCEDURES

## 2022-10-19 PROCEDURE — 2500000003 HC RX 250 WO HCPCS: Performed by: ANESTHESIOLOGY

## 2022-10-19 PROCEDURE — 7100000010 HC PHASE II RECOVERY - FIRST 15 MIN: Performed by: ANESTHESIOLOGY

## 2022-10-19 PROCEDURE — 2709999900 HC NON-CHARGEABLE SUPPLY: Performed by: ANESTHESIOLOGY

## 2022-10-19 RX ORDER — BUPIVACAINE HYDROCHLORIDE 2.5 MG/ML
INJECTION, SOLUTION EPIDURAL; INFILTRATION; INTRACAUDAL PRN
Status: DISCONTINUED | OUTPATIENT
Start: 2022-10-19 | End: 2022-10-19 | Stop reason: ALTCHOICE

## 2022-10-19 RX ORDER — LIDOCAINE HYDROCHLORIDE 10 MG/ML
INJECTION, SOLUTION EPIDURAL; INFILTRATION; INTRACAUDAL; PERINEURAL PRN
Status: DISCONTINUED | OUTPATIENT
Start: 2022-10-19 | End: 2022-10-19 | Stop reason: ALTCHOICE

## 2022-10-19 ASSESSMENT — PAIN DESCRIPTION - LOCATION: LOCATION: BACK

## 2022-10-19 ASSESSMENT — PAIN DESCRIPTION - ORIENTATION: ORIENTATION: LOWER

## 2022-10-19 ASSESSMENT — PAIN SCALES - GENERAL: PAINLEVEL_OUTOF10: 1

## 2022-10-19 NOTE — TELEPHONE ENCOUNTER
Pt .had MBB today and forgot to ask for a letter oking he to be off today from work for the procedure. Will write up.

## 2022-10-19 NOTE — PROCEDURES
Pre-operative Diagnosis: Lumbar facet pain     Post-operative Diagnosis: Lumbar facet pain     Procedure: BIlateral lumbar medial branch blocks targeting facet joints of L4/L5 and L5/S1     Procedure Description:  After consent was obtained, the patient was placed in the prone position with a pillow under the abdomen to reduce the lordotic curve of the lumbar spine. The lower back was prepped with chloraprep and draped in a sterile fashion. Then, 0.5 cc of 1 % lidocaine was used for local anesthesia of the skin and superficial subcutaneous tissues. Three 22-gauge 3.5-inch spinal needles were advanced under fluoroscopy in an AP view: one at the sacral ala and two at the junction of the right superior articular process and the transverse process of the L4 and L5 vertebrae. There was no paresthesias, heme, or CSF obtained. Needle placement was confirmed using AP and oblique views. Then, 0.5 cc of 0.5% bupivacaine was injected at each site without complications. The procedure was repeated on the contralateral side. The patient tolerated the procedure well, transported to the recovery room, and discharged in ambulatory fashion.      Procedural Complications: None  Estimated Blood Loss: 0 mL       Aaron Thomas DO  Interventional Pain Management/PM&R   . [facility] and [address] [facility]

## 2022-10-19 NOTE — POST SEDATION
[facility]  Sedation/Analgesia Post Sedation Record    Pt Name: Tutu Gilliam  MRN: [id number]  YOB: 1975  Procedure Performed By: Cait Lowery DO  Primary Care Physician: Keyla Kramer PA-C    POST-PROCEDURE    Physicians/Assistants: Cait Lowery DO  Procedure Performed: See Procedure Note   Sedation/Anesthesia: Versed and Fentanyl (See procedure note for amount and duration)  Estimated Blood Loss:     0  ml  Specimens Removed: None        Complications: None           Aaron Blanca DO  Electronically signed 10/19/2022 at 1:49 PM

## 2022-10-19 NOTE — PRE SEDATION
[facility]  Pre-Sedation/Analgesia History & Physical    Pt Name: Manjula Leos  MRN: [id number]  YOB: 1975  Provider Performing Procedure: Ashlee Ferraro DO   Primary Care Physician: Cathy Hair PA-C      MEDICAL HISTORY       has a past medical history of Asthma, Heterozygous MTHFR mutation X0621B, Hyperlipidemia, Polycystic ovarian disease, Sleep apnea, and Thyroid disease. SURGICAL HISTORY   has a past surgical history that includes Bunionectomy (Right, 2011); Upper gastrointestinal endoscopy (over 5 years ago ?); US BREAST BIOPSY W LOC DEVICE 1ST LESION LEFT (Left, Feb 10/2015); US BREAST BIOPSY W LOC DEVICE 1ST LESION RIGHT (Right, 2020); and Breast surgery (Left, 2/26/15). ALLERGIES   Allergies as of 09/26/2022 - Fully Reviewed 09/26/2022   Allergen Reaction Noted    Molds & smuts  12/10/2012    Penicillins Other (See Comments) 10/24/2013       MEDICATIONS   Prior to Admission medications    Medication Sig Start Date End Date Taking?  Authorizing Provider   diphenhydrAMINE (BENADRYL) 25 MG tablet Take 25 mg by mouth every 6 hours as needed for Itching    Historical Provider, MD   naproxen (NAPROSYN) 500 MG tablet Take 500 mg by mouth 2 times daily (with meals)    Historical Provider, MD   metoprolol tartrate (LOPRESSOR) 50 MG tablet Take 50 mg by mouth 2 times daily    Historical Provider, MD   CONTRAVE 8-90 MG per extended release tablet  10/12/21   Historical Provider, MD   Cyanocobalamin (B-12 PO) Take by mouth    Historical Provider, MD   Cholecalciferol (RA VITAMIN D-3 PO) Take by mouth    Historical Provider, MD   omeprazole (PRILOSEC) 20 MG delayed release capsule Take 20 mg by mouth daily    Historical Provider, MD   Rosuvastatin Calcium (CRESTOR PO) Take 5 mg by mouth    Historical Provider, MD   levothyroxine (SYNTHROID) 125 MCG tablet Take 150 mcg by mouth Daily    Historical Provider, MD   fluticasone-salmeterol (ADVAIR) 250-50 MCG/DOSE AEPB Inhale 1 puff into the lungs daily as needed. Historical Provider, MD   Multiple Vitamins-Minerals (WOMENS ONE DAILY PO) Take 1 tablet by mouth daily. Historical Provider, MD   norgestimate-ethinyl estradiol (ORTHO-CYCLEN) 0.25-35 MG-MCG per tablet Take 1 tablet by mouth daily. Historical Provider, MD   metformin (GLUCOPHAGE) 500 MG tablet Take 1,000 mg by mouth 2 times daily (with meals). Historical Provider, MD   ferrous sulfate 325 (65 FE) MG tablet Take 325 mg by mouth daily (with breakfast). Historical Provider, MD   FLUoxetine (PROZAC) 20 MG capsule Take 40 mg by mouth daily    Historical Provider, MD   Omega-3 Fatty Acids (FISH OIL) 1200 MG CAPS Take 1 tablet by mouth daily. Historical Provider, MD   folic acid (FOLVITE) 374 MCG tablet Take 800 mcg by mouth daily    Historical Provider, MD   ValACYclovir HCl (VALTREX PO) Take 1 tablet by mouth daily as needed. Historical Provider, MD   Albuterol Sulfate (PROAIR HFA IN) Inhale 2 puffs into the lungs daily as needed. Historical Provider, MD     PHYSICAL:   Vitals:    10/19/22 1120   BP: 122/80   Pulse: 71   Resp: 18   Temp: 97.2 °F (36.2 °C)   SpO2: 95%     PLANNED PROCEDURE   See procedure note  SEDATION  Planned agent: Versed and Fentanyl  ASA Classification: 2  Class 1: A normal healthy patient  Class 2: Pt with mild to moderate systemic disease  Class 3: Severe systemic disease or disturbance  Class 4: Severe systemic disorders that are already life threatening. Class 5: Moribund pt with little chances of survival, for more than 24 hours. Mallampati I Airway Classification: 2    1. Pre-procedure diagnostic studies complete and results available. 2. Previous sedation/anesthesia experiences assessed. 3. The patient is an appropriate candidate to undergo the planned procedure sedation and anesthesia. (Refer to nursing sedation/analgesia documentation record)  4.  Formulation and discussion of sedation/procedure plan, risks, and expectations with patient and/or responsible adult completed. 5. Patient examined immediately prior to the procedure.  (Refer to nursing sedation/analgesia documentation record)    Katheleen Hamman, DO  Electronically signed 10/19/2022 at 1:48 PM

## 2022-10-19 NOTE — PROGRESS NOTES
1120- Pt to PACU phase 2 local only. Della Duenas RN at bedside for report. Pt hooked up to monitor, VSS.  1121- Pt given water.    1127- PT dressing  8571- Ride called  2876- pt discharged walked out to car with this RN

## 2022-10-21 ENCOUNTER — OFFICE VISIT (OUTPATIENT)
Dept: PULMONOLOGY | Age: 47
End: 2022-10-21
Payer: COMMERCIAL

## 2022-10-21 VITALS
BODY MASS INDEX: 38.09 KG/M2 | WEIGHT: 237 LBS | DIASTOLIC BLOOD PRESSURE: 86 MMHG | SYSTOLIC BLOOD PRESSURE: 132 MMHG | HEART RATE: 67 BPM | HEIGHT: 66 IN | OXYGEN SATURATION: 99 % | TEMPERATURE: 97.7 F

## 2022-10-21 DIAGNOSIS — G47.33 OBSTRUCTIVE SLEEP APNEA ON CPAP: Primary | ICD-10-CM

## 2022-10-21 DIAGNOSIS — E66.9 OBESITY (BMI 30-39.9): ICD-10-CM

## 2022-10-21 DIAGNOSIS — Z99.89 OBSTRUCTIVE SLEEP APNEA ON CPAP: Primary | ICD-10-CM

## 2022-10-21 PROCEDURE — 99213 OFFICE O/P EST LOW 20 MIN: CPT | Performed by: PHYSICIAN ASSISTANT

## 2022-10-21 ASSESSMENT — ENCOUNTER SYMPTOMS
DIARRHEA: 0
NAUSEA: 0
ALLERGIC/IMMUNOLOGIC NEGATIVE: 1
COUGH: 0
WHEEZING: 0
EYES NEGATIVE: 1
BACK PAIN: 1
STRIDOR: 0
SHORTNESS OF BREATH: 0
CHEST TIGHTNESS: 0

## 2022-10-21 NOTE — PROGRESS NOTES
[facility]      Manjula Leos         [id number]  10/21/2022   Chief Complaint   Patient presents with    Follow-up     1 year GROVER        Pt of Dr. Jaqui Meraz     PAP Download:   Original or initial AHI: 12.0     Date of initial study: 10/29/2013        Compliant  93%     Noncompliant 7 %     PAP Type CPAP Level  13   Avg Hrs/Day [id number]  AHI: 1.1   Recorded compliance dates : 9/18/22-10/17/22  Machine/Mfg:   [x] ResMed    [] Respironics/Dreamstation   Interface:   [] Nasal    [] Nasal pillows   [x] FFM      Provider:      [] [facility]     [x]Jair     [] [employer]    [] [address]    [] [place]               [] [facility]      [] [employer]    [] [address]      [] Other    Neck Size: 15  Mallampati 3  ESS:  8  SAQLI: 84    Here is a scan of the most recent download:            Presentation:   Natalie presents for sleep medicine follow up for obstructive sleep apnea  Since the last visit, Natalie is doing well with PAP. She is sleeping well and feels rested. She had HST in 2013 after losing weight and was able to come off PAP for about 2 years then gained weight back and placed back on PAP with good benefit. Equipment issues: The pressure is  acceptable, the mask is acceptable     Sleep issues:  Do you feel better? Yes  More rested? Yes   Better concentration? yes    Progress History:   Since last visit any new medical issues? No  New ER or hospital visits? No  Any new or changes in medicines? No  Any new sleep medicines? No    Review of Systems -   Review of Systems   Constitutional:  Negative for activity change, appetite change, chills and fever. HENT:  Negative for congestion and postnasal drip. Eyes: Negative. Respiratory:  Negative for cough, chest tightness, shortness of breath, wheezing and stridor. Cardiovascular:  Negative for chest pain and leg swelling. Gastrointestinal:  Negative for diarrhea and nausea. Endocrine: Negative.     Genitourinary: Negative. Musculoskeletal:  Positive for back pain. Negative for arthralgias. Skin: Negative. Allergic/Immunologic: Negative. Neurological: Negative. Negative for dizziness and light-headedness. Psychiatric/Behavioral: Negative. All other systems reviewed and are negative. Physical Exam:    BMI:  Body mass index is 38.25 kg/m². Wt Readings from Last 3 Encounters:   10/21/22 237 lb (107.5 kg)   10/19/22 234 lb (106.1 kg)   09/26/22 243 lb (110.2 kg)     Weight lost 14 lbs over 1 year  Vitals: /86 (Site: Right Upper Arm, Position: Sitting, Cuff Size: Medium Adult)   Pulse 67   Temp 97.7 °F (36.5 °C) (Skin)   Ht 5' 6\" (1.676 m)   Wt 237 lb (107.5 kg)   LMP 10/09/2022   SpO2 99%   BMI 38.25 kg/m²       Physical Exam  Constitutional:       Appearance: Normal appearance. She is normal weight. HENT:      Head: Normocephalic and atraumatic. Right Ear: External ear normal.      Left Ear: External ear normal.      Nose: Nose normal.   Eyes:      Extraocular Movements: Extraocular movements intact. Conjunctiva/sclera: Conjunctivae normal.      Pupils: Pupils are equal, round, and reactive to light. Pulmonary:      Effort: Pulmonary effort is normal.   Musculoskeletal:      Cervical back: Normal range of motion and neck supple. Neurological:      General: No focal deficit present. Mental Status: She is alert and oriented to person, place, and time. Psychiatric:         Attention and Perception: Attention and perception normal.         Mood and Affect: Mood and affect normal.         Speech: Speech normal.         Behavior: Behavior normal. Behavior is cooperative. Thought Content: Thought content normal.         Cognition and Memory: Cognition normal.         Judgment: Judgment normal.         ASSESSMENT/DIAGNOSIS     Diagnosis Orders   1. Obstructive sleep apnea on CPAP        2. Obesity (BMI 30-39. 9)                 Plan   Do you need any equipment today? Yes update supplies  - if she loses more weight, will retest  - Download reviewed and discussed with patient  - She  was advised to continue current positive airway pressure therapy with above described pressure. - She  advised to keep good compliance with current recommended pressure to get optimal results and clinical improvement  - Recommend 7-9 hours of sleep with PAP  - She was advised to call [facility] regarding supplies if needed.   -She call my office for earlier appointment if needed for worsening of sleep symptoms.   - She was instructed on weight loss  - Jose R Hurtado was educated about my impression and plan. Patient verbalizesunderstanding.   We will see Araceli Babinski back in: 1 year with download    Information added by my medical assistant/LPN was reviewed today       Leonard Wu, [address] for pulmonary and Sleep Medicine  10/21/2022

## 2022-11-02 ENCOUNTER — HOSPITAL ENCOUNTER (OUTPATIENT)
Dept: MRI IMAGING | Age: 47
Discharge: HOME | End: 2022-11-02

## 2022-11-02 ENCOUNTER — OFFICE VISIT (OUTPATIENT)
Dept: PHYSICAL MEDICINE AND REHAB | Age: 47
End: 2022-11-02
Payer: COMMERCIAL

## 2022-11-02 ENCOUNTER — HOSPITAL ENCOUNTER (OUTPATIENT)
Dept: GENERAL RADIOLOGY | Age: 47
Discharge: HOME | End: 2022-11-02

## 2022-11-02 VITALS
SYSTOLIC BLOOD PRESSURE: 138 MMHG | WEIGHT: 237 LBS | BODY MASS INDEX: 38.09 KG/M2 | HEIGHT: 66 IN | DIASTOLIC BLOOD PRESSURE: 82 MMHG

## 2022-11-02 DIAGNOSIS — G89.4 CHRONIC PAIN SYNDROME: ICD-10-CM

## 2022-11-02 DIAGNOSIS — Z00.6 EXAMINATION FOR NORMAL COMPARISON FOR CLINICAL RESEARCH: ICD-10-CM

## 2022-11-02 DIAGNOSIS — M53.3 SI (SACROILIAC) JOINT DYSFUNCTION: ICD-10-CM

## 2022-11-02 DIAGNOSIS — M53.3 SACROILIAC JOINT PAIN: ICD-10-CM

## 2022-11-02 DIAGNOSIS — M47.816 LUMBAR SPONDYLOSIS: ICD-10-CM

## 2022-11-02 DIAGNOSIS — M47.816 FACET HYPERTROPHY OF LUMBAR REGION: Primary | ICD-10-CM

## 2022-11-02 PROCEDURE — 99214 OFFICE O/P EST MOD 30 MIN: CPT | Performed by: NURSE PRACTITIONER

## 2022-11-02 NOTE — PROGRESS NOTES
Chronic Pain/PM&R Clinic Note     Encounter Date: 11/2/22    Subjective:   Chief Complaint:   Chief Complaint   Patient presents with    Follow-up    Pain     Pain reported right lower back above buttocks. History of Present Illness:   Fede Turner is a 48 y.o. female seen in the clinic initially on 09/26/2022 upon request from Dr Candance Coots for her history of SI, low back pain. She has a personal medical history of GROVER, asthma, obesity, pre-diabetic, anxiety, depression. Patient presents with complaints of bilateral low back pain, right is the worst, does go into the buttocks and occasionally does have some thigh pain. She denies pain going into the legs or feet. She describes the pain as a constant dull ache. She reports pain for 18 + months. She fell on the ice in 2021, no fractures that she knew of, and no significant back pain at that time. Then she went to [facility], walked a lot, and then noticed the back pain. She has tried chiropractor, massage, physical therapy with relief, OTCs, topicals with mild relief. Pain is increased walking, standing, up.down stairs, change in position, sitting to long. Pain is alleviated by heat, naproxen. Mornings are the worst, she feels stiff but pain does continue throughout the day. She tries to do exercises at home, but minimally. 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 Denies any numbness, tingling, loss of bowel or bladder. She reports [facility] did give her a intramuscular injection with no relief. Pain scale : at worst pain is 9/10, at best pain is 2/10. Today, 11/02/2022, patient presents for planned follow up. She completed bilateral lumbar medical branch blocks at L4-5, L5-S1 with Dr Darcy Ganser on 10/19/2022. Procedure went well, she states she had relief for about 5 days. She reports she was able to walk for longer distance, do home exercises with pain at a more tolerable levels.  She would like to continue with there series. She denies any new numbness, tingling, loss of bowel or bladder. She is also asking if we received images and notes from [address] [place] [address] regarding her right hip pain, she was told she could possibly get injections with us in the future there. History of Interventions:   Surgery: No previous lumbar surgeries  Injections: b/l MBB L4-5, L5-S1 (10/19/22)- relief x 5 days     Current Treatment Medications:   Naproxen- relief    Historical Treatment Medications:   Tylenol- with relief  Advil- with relief    Imaging:    Ordering Provider: Cee Crawford          Radiology Department  Patient:  Jie Conrad Aliciaburgh. :  1975   Sex: Kris, Tawnya [facility]  Location:  [address] [place] [address]   Unit #:   [id number]       Acct #:  [de-identified]       Ordering Phys: Shalonda Matt PA-C            Exam Date: 22     Accession #:  [id number]     Exam:  MRI   MRI Lumbar Spine w/o Contrast     Result: See Report            EXAM:  MR LUMBAR SPINE WITHOUT INTRAVENOUS CONTRAST          CLINICAL INDICATION:  LOW BACK PAIN X1 YEAR -- ASSOCIATED RIGHT HIP PAIN     -- FALL INJURY 2021          TECHNIQUE:  Multiplanar and multisequence MR images of the lumbar spine     without intravenous contrast.  This report was created using Guesty     report generation technology. COMPARISON:  None. FINDINGS:          VERTEBRAE:  Unremarkable. Vertebral body heights are preserved. Normal     vertebral bodies and posterior elements. Normal alignment. No     spondylolisthesis. There is preservation of the normal lumbar lordosis. SPINAL CORD:  Unremarkable. Normal position and signal intensity of the     conus medullaris. SOFT TISSUES:  Unremarkable. DISCS/SPINAL CANAL/NEURAL FORAMINA:          L1-L2:  Unremarkable. Normal disc height and morphology. Normal spinal     canal and lateral recess. Normal neuroforamina. L2-L3:  Unremarkable. Normal disc height and morphology. Normal spinal     canal and lateral recesses. Normal neuroforamina. L3-L4:  Unremarkable. Normal disc height and morphology. Facet     arthropathy causes mild impression on the thecal sac but no significant     spinal stenosis. Normal neuroforamina. L4-L5:  Unremarkable. Normal disc height and morphology. Facet     arthropathy causes mild impression on thecal sac but no significant spinal     stenosis. Normal neuroforamina. L5-S1:  Unremarkable. Normal disc height and morphology. Facet     arthropathy causes mild impression on the thecal sac but no significant     spinal stenosis. Normal neuroforamina. IMPRESSION:     Facet arthropathy at L3-4, L4-5 and L5-S1 minor impression on the thecal     sac. Otherwise normal MRI lumbar spine. Electronically Signed:     Jose R Gonsalez. MD [place]     2022/06/20 at 14:38 EDT     Reading Location ID and State: [facility]     Tel [phone], Service support  [phone], Fax [phone]                 Past Medical History:   Diagnosis Date    Asthma     Heterozygous MTHFR mutation [id number]     has half gene from mother     Hyperlipidemia     Polycystic ovarian disease     Sleep apnea     Thyroid disease        Past Surgical History:   Procedure Laterality Date    BREAST SURGERY Left 2/26/15    Dr. Shreyas Sue Bilateral 10/19/2022    medial branch blocks bilateral Lumbar4-5, 5-Sacral 1 performed by Katheleen Hamman, DO at [address] [place] [address]  over 5 years ago ?     US BREAST NEEDLE BIOPSY LEFT [facility] Feb 10/2015    [facility]    US BREAST NEEDLE BIOPSY RIGHT Right 2020       Family History   Problem Relation Age of Onset    High Blood Pressure Father     High Cholesterol Father     Cancer Paternal Uncle 76        pancreatic    Diabetes Maternal Grandmother     Cancer negative for anxiety/depression   All other systems reviewed and are negative    Objective:     Vitals:    11/02/22 0730   BP: 138/82     Constitutional: Pleasant, no acute distress   Head: Normocephalic, atraumatic   Eyes: Conjunctivae normal   Neck: Supple, symmetrical   Lungs: Normal respiratory effort, non-labored breathing   Cardiovascular: Limbs warm and well perfused   Abdomen: Non-protruded   Musculoskeletal: Muscle bulk symmetric, no atrophy, no gross deformities   · Lumbar Spine: ROM WNL. Lumbar paraspinals non-tender to palpation bilaterally. SLR neg bilaterally. ROSEY pos bilaterally. GAENSLEN pos bilaterally. Positive facet loading bilaterally. Neurological: Cranial nerves II-XII grossly intact. · Gait - Normal, non-antalgic gait. Ambulates without assistive device. · Motor: 5/5 muscle strength in bilateral hip flexion, knee flexion, knee extension, ankle dorsiflexion, and ankle plantar flexion   · Sensory: LT sensation intact in lower limbs   · Reflexes: 2+ symmetrical in bilateral achilles, 2+ bilateral patellar  Skin: No rashes or lesions present   Psychological: Cooperative, no exaggerated pain behaviors       Assessment:    Diagnosis Orders   1. Facet hypertrophy of lumbar region        2. Lumbar spondylosis        3. Sacroiliac joint pain        4. SI (sacroiliac) joint dysfunction        5. Chronic pain syndrome               Ro Meraz is a 48 y. o.female presenting to the pain clinic for evaluation of low back pain, SI pain. She has noted axial lumbar facet mediated pain on exam and with voiced complaints. Discussed procedures to assist with pain control, Lumbar facet series targeting bilateral facet levels of L4-5,L5-S1. She would like to proceed with this series, with plans for radiofrequency ablation in the future. She can continue Naproxen, OTC Tylneol, ice, heat, topicals, stretches and educated on home exercises. I will see her back after procedures.      With significant benefit from bilateral diagnostic medial branch blocks targeting L4-5, L5-S1, I have set her up for confirmatory blocks at the same levels. We will also get images from [facility] completed this summer on her right hip. I will see her back after injections      Plan: The following treatment recommendations and plan were discussed in detail with Corinne Pollack. Imaging:   I have reviewed patients imaging of lumbar XR, Lumbar MRI and results were discussed with patient today. Analgesics:    Patient is taking Acetaminophen. Patient informed that the maximum amount of acetaminophen taken on a regular basis should only be 4000 mg per day. Patient is taking naproxen. Patient is advised to take as prescribed and not take on an empty stomach. Adjuvants:   None    Interventions: In presence of lumbar axial back pain and with physical exam consistent for facetal pain, the option of confirmatory medial branch blocks targeting the facet joints at bilateral L4-5,L5-S1 was chosen. The risks and benefits were discussed in detail with the patient. Patient wants to proceed with the injection with Dr Ruben Jerez    Anticoagulation/NPO Recommendations:   Patient does need to hold any medications prior to the procedure, Naproxen, NSAIDs x 4 days  Patient does not need to be NPO prior to the procedure. LOCAL only     Multidisciplinary Pain Management:   In the presence of complex, chronic, and multi-factorial pain, the importance of a multidisciplinary approach to pain management in the patients management regimen was emphasized and discussed in great detail. OBESITY: Patient is advised to seek nutrition consult to help in managing their weight to decrease its impact on pain. The patient was also advised to continue physical therapy and stretching exercises at home and cognitive behavioral and/or group therapy.      Referrals:  None    Prescriptions Written This Visit:   None    Follow-up:   After procedures     It was my pleasure to evaluate Araceli Babinski today. I spent over 35 minutes evaluating this patient, reviewing previous notes and images and completing documentation.        Ivis Montalvo, APRN - CNP   Interventional Pain Management/PM&R   [place]

## 2022-11-02 NOTE — PATIENT INSTRUCTIONS
Learning About Medial Branch Block and Neurotomy  What are medial branch block and neurotomy? Facet joints connect your vertebrae to each other. Problems in these joints can cause chronic (long-term) pain in the neck or back. Medial branch nerves are the nerves that carry many of the pain messages from your facet joints. Radiofrequency medial branch neurotomy is a type of medial branch neurotomy that is used to relieve arthritis pain. It uses radio waves to damage nerves in your neck or back so that they can no longer send pain messages to your brain. Before your doctor knows if a neurotomy will help you, you will get a medial branch block to find out if certain nerves are the ones that are a source of your pain. You will need two separate visits to the outpatient center or hospital to have both procedures. You will need someone to drive you home. How is a medial branch block done? The doctor will use a tiny needle to numb the skin where you will get the block. Then the doctor puts the block needle into the numbed area. You may feel some pressure, but you should not feel pain. Using fluoroscopy (live X-ray) to guide the needle, the doctor injects medicine onto one or more nerves to make them numb. If you get relief from your pain in the next 4 to 6 hours, it's a sign that those nerves may be contributing to your pain. The relief will last only a short time. You may then have a medial branch neurotomy at a later visit to try to get longer relief. How is a medial branch neurotomy done? The doctor will use a tiny needle to numb the skin where you will get the neurotomy. Then the doctor puts the neurotomy needle into the numbed area. You may feel some pressure. Using fluoroscopy (live X-ray) to guide the needle, the doctor sends radio waves through the needle to the nerve for 60 to 90 seconds. The radio waves heat the nerve, which damages it. The doctor may do this several times.  And more than one nerve may be treated. How long do medial branch block and neurotomy take? It takes 20 to 30 minutes to get the block. You can go home after the doctor watches you for about an hour. It takes 45 to 90 minutes to get a neurotomy, depending on how many nerves are heated. You will probably go home 30 to 60 minutes after the procedure. What can you expect after a neurotomy? You will get instructions on how to report how much pain you have when you are at home. You may feel a little sore or tender at the injection site at first. But after a successful neurotomy, most people have pain relief right away. It often lasts for several months, but your pain may come back. If your pain does come back, it may mean that the damaged nerve has healed and can send pain messages again. Or it can mean that a different nerve is causing pain. Your doctor will discuss your options with you. Follow-up care is a key part of your treatment and safety. Be sure to make and go to all appointments, and call your doctor if you are having problems. It's also a good idea to know your test results and keep a list of the medicines you take. Where can you learn more? Go to https://[employer]. org and sign in to your PingStamp account. Enter O250 in the Posiq box to learn more about \"Learning About Medial Branch Block and Neurotomy. \"     If you do not have an account, please click on the \"Sign Up Now\" link. Current as of: April 8, 2021               Content Version: 13.1  © 2006-2021 Healthwise, Incorporated. Care instructions adapted under license by [facility] ([facility]). If you have questions about a medical condition or this instruction, always ask your healthcare professional. [place] [address] any warranty or liability for your use of this information.

## 2022-11-28 ENCOUNTER — TELEPHONE (OUTPATIENT)
Dept: PHYSICAL MEDICINE AND REHAB | Age: 47
End: 2022-11-28

## 2022-11-28 NOTE — TELEPHONE ENCOUNTER
Pt. Called the office. Tested Covid + 2 days ago and symptomatic. Procedure and follow up rescheduled. Verbalized understanding.

## 2022-12-29 NOTE — DISCHARGE INSTRUCTIONS

## 2022-12-30 ENCOUNTER — TELEPHONE (OUTPATIENT)
Dept: PHYSICAL MEDICINE AND REHAB | Age: 47
End: 2022-12-30

## 2022-12-30 ENCOUNTER — PREP FOR PROCEDURE (OUTPATIENT)
Dept: PHYSICAL MEDICINE AND REHAB | Age: 47
End: 2022-12-30

## 2023-01-06 ENCOUNTER — PREP FOR PROCEDURE (OUTPATIENT)
Dept: PHYSICAL MEDICINE AND REHAB | Age: 48
End: 2023-01-06

## 2023-01-11 NOTE — H&P
Today, patient presents for planned confirmatory medial branch blocks targeting the facet joints at bilateral L4-5,L5-S1    This note is reflective of the patient's previous visit for evaluation. We will proceed with today's planned procedure. Since patient's last visit for evaluation, there have been no interval changes in medical history. Patient has no new numbness, weakness, or focal neurological deficit since evaluation. Patient has no contraindications to injection (no anticoagulation or recent antibiotic intake for active infections), and has a  present or is able to drive themselves (as discussed and cleared by physician). Allergies to latex, contrast dye, and steroid medications have been confirmed with the patient prior to the procedure. NPO necessity has been assessed and accepted based on procedure complexity. The risks and benefits of the procedure have been explained including but are not limited to infection, bleeding, paralysis, immediate post procedure weakness, and dizziness; the patient acknowledges understanding and desires to proceed with the procedure. Patient has signed consent for same procedure as discussed in previous clinic encounter. All other questions and concerns were addressed at bedside. See procedure note for full details. Post procedure Instructions: The patient was advised not to drive during the day of the procedure and not to engage in any significant decision making (unless otherwise states by physician). The patient was also advised to be cautious with walking/activity for 24 hours following today's visit and asked not to engage in over-exertion (unless otherwise states by physician). After this time, it is ok to resume pre-procedure level of activity. Patient advised to apply ice to site of injection in situations of pain and discomfort. Patient advised to not submerge site of injection during bath or pool activities for approximately 24 hours post-procedure. Patient attested to understanding post procedure directions / restrictions. All other questions and concerns addressed before patient discharge in ambulatory fashion. Chronic Pain/PM&R Clinic Note     Encounter Date: 11/2/22    Subjective:   Chief Complaint:   No chief complaint on file. History of Present Illness:   Ashly Landin is a 50 y.o. female seen in the clinic initially on 09/26/2022 upon request from  [facility] for her history of SI, low back pain. She has a personal medical history of GROVER, asthma, obesity, pre-diabetic, anxiety, depression. Patient presents with complaints of bilateral low back pain, right is the worst, does go into the buttocks and occasionally does have some thigh pain. She denies pain going into the legs or feet. She describes the pain as a constant dull ache. She reports pain for 18 + months. She fell on the ice in 2021, no fractures that she knew of, and no significant back pain at that time. Then she went to [facility], walked a lot, and then noticed the back pain. She has tried chiropractor, massage, physical therapy with relief, OTCs, topicals with mild relief. Pain is increased walking, standing, up.down stairs, change in position, sitting to long. Pain is alleviated by heat, naproxen. Mornings are the worst, she feels stiff but pain does continue throughout the day. She tries to do exercises at home, but minimally. 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 Denies any numbness, tingling, loss of bowel or bladder. She reports [facility] did give her a intramuscular injection with no relief. Pain scale : at worst pain is 9/10, at best pain is 2/10. Today, 11/02/2022, patient presents for planned follow up. She completed bilateral lumbar medical branch blocks at L4-5, L5-S1 with Dr Agustin Ku on 10/19/2022. Procedure went well, she states she had relief for about 5 days.  She reports she was able to walk for longer distance, do home exercises with pain at a more tolerable levels. She would like to continue with there series. She denies any new numbness, tingling, loss of bowel or bladder. She is also asking if we received images and notes from [facility] regarding her right hip pain, she was told she could possibly get injections with us in the future there. History of Interventions:   Surgery: No previous lumbar surgeries  Injections: b/l MBB L4-5, L5-S1 (10/19/22)- relief x 5 days     Current Treatment Medications:   Naproxen- relief    Historical Treatment Medications:   Tylenol- with relief  Advil- with relief    Imaging:    Ordering Provider: Cee Crawford          Radiology Department  Patient:  Mery Conrad Aliciabarnoldo. :  1975   Sex: Kris, Tawnya [facility]  Location:  [address] [place] [address]   Unit #:   [id number]       Acct #:  [de-identified]       Ordering Phys: Olivia King PA-C            Exam Date: 22     Accession #:  [id number]     Exam:  MRI   MRI Lumbar Spine w/o Contrast     Result: See Report            EXAM:  MR LUMBAR SPINE WITHOUT INTRAVENOUS CONTRAST          CLINICAL INDICATION:  LOW BACK PAIN X1 YEAR -- ASSOCIATED RIGHT HIP PAIN     -- FALL INJURY 2021          TECHNIQUE:  Multiplanar and multisequence MR images of the lumbar spine     without intravenous contrast.  This report was created using [employer]     report generation technology. COMPARISON:  None. FINDINGS:          VERTEBRAE:  Unremarkable. Vertebral body heights are preserved. Normal     vertebral bodies and posterior elements. Normal alignment. No     spondylolisthesis. There is preservation of the normal lumbar lordosis. SPINAL CORD:  Unremarkable. Normal position and signal intensity of the     conus medullaris. SOFT TISSUES:  Unremarkable. DISCS/SPINAL CANAL/NEURAL FORAMINA:          L1-L2:  Unremarkable.   Normal disc height and morphology. Normal spinal     canal and lateral recess. Normal neuroforamina. L2-L3:  Unremarkable. Normal disc height and morphology. Normal spinal     canal and lateral recesses. Normal neuroforamina. L3-L4:  Unremarkable. Normal disc height and morphology. Facet     arthropathy causes mild impression on the thecal sac but no significant     spinal stenosis. Normal neuroforamina. L4-L5:  Unremarkable. Normal disc height and morphology. Facet     arthropathy causes mild impression on thecal sac but no significant spinal     stenosis. Normal neuroforamina. L5-S1:  Unremarkable. Normal disc height and morphology. Facet     arthropathy causes mild impression on the thecal sac but no significant     spinal stenosis. Normal neuroforamina. IMPRESSION:     Facet arthropathy at L3-4, L4-5 and L5-S1 minor impression on the thecal     sac. Otherwise normal MRI lumbar spine. Electronically Signed:     Victor Hugo Arredondo. MD [place]     2022/06/20 at 14:38 EDT     Reading Location ID and State: [id number]     Tel [phone], Service support  [phone], Fax [phone]                 Past Medical History:   Diagnosis Date    Asthma     Heterozygous MTHFR mutation [id number]     has half gene from mother     Hyperlipidemia     Polycystic ovarian disease     Sleep apnea     Thyroid disease        Past Surgical History:   Procedure Laterality Date    BREAST SURGERY Left 2/26/15    Dr. Kervin Chowdhury Bilateral 10/19/2022    medial branch blocks bilateral Lumbar4-5, 5-Sacral 1 performed by Madai Mojica DO at [address] [place] [address]  over 5 years ago ?     US BREAST NEEDLE [facility]OP[facility] LEFT [facility] 10/2015    [facility]    US BREAST NEEDLE BIOPSY RIGHT Right 2020       Family History   Problem Relation Age of Onset    High Blood Pressure Father     High Cholesterol Father Cancer Paternal Uncle 76        pancreatic    Diabetes Maternal Grandmother     Cancer Paternal Grandfather [de-identified]        pancreatic    Cancer Maternal Aunt 79        pancreatic    Breast Cancer Paternal Cousin         >50    Cancer Paternal Uncle 76        pancreatic    Breast Cancer Paternal Cousin         >50    Asthma Neg Hx     Kidney Disease Neg Hx          Medications & Allergies:   Current Outpatient Medications   Medication Instructions    Albuterol Sulfate (PROAIR HFA IN) 2 puffs, Inhalation, DAILY PRN    Cholecalciferol (RA VITAMIN D-3 PO) Oral    CONTRAVE 8-90 MG per extended release tablet No dose, route, or frequency recorded. Cyanocobalamin (B-12 PO) Oral    diphenhydrAMINE (BENADRYL) 25 mg, Oral, EVERY 6 HOURS PRN    ferrous sulfate (IRON 325) 325 mg, DAILY WITH BREAKFAST    FLUoxetine (PROZAC) 40 mg, Oral, DAILY,      fluticasone-salmeterol (ADVAIR) 250-50 MCG/DOSE AEPB 1 puff, Inhalation, DAILY PRN    folic acid (FOLVITE) 337 mcg, Oral, DAILY,      levothyroxine (SYNTHROID) 150 mcg, Oral, DAILY    metFORMIN (GLUCOPHAGE) 1,000 mg, Oral, 2 TIMES DAILY WITH MEALS    metoprolol tartrate (LOPRESSOR) 50 mg, Oral, 2 TIMES DAILY    Multiple Vitamins-Minerals (WOMENS ONE DAILY PO) 1 tablet, DAILY    naproxen (NAPROSYN) 500 mg, Oral, 2 TIMES DAILY WITH MEALS    norgestimate-ethinyl estradiol (ORTHO-CYCLEN) 0.25-35 MG-MCG per tablet 1 tablet, DAILY    Omega-3 Fatty Acids (FISH OIL) 1200 MG CAPS 1 tablet, Oral, DAILY    omeprazole (PRILOSEC) 20 mg, Oral, DAILY    Rosuvastatin Calcium (CRESTOR PO) 5 mg, Oral    ValACYclovir HCl (VALTREX PO) 1 tablet, Oral, DAILY PRN       Allergies   Allergen Reactions    Molds & Smuts      Through allergy testing    Penicillins Other (See Comments)     Unsure of reaction-?  Really allergic       Review of Systems:   Constitutional: negative for weight changes or fevers  Genitourinary: negative for bowel/bladder incontinence   Musculoskeletal: positive for low back pain, SI pain  Neurological: negative for any leg weakness or numbness/tingling  Behavioral/Psych: negative for anxiety/depression   All other systems reviewed and are negative    Objective: There were no vitals filed for this visit. Constitutional: Pleasant, no acute distress   Head: Normocephalic, atraumatic   Eyes: Conjunctivae normal   Neck: Supple, symmetrical   Lungs: Normal respiratory effort, non-labored breathing   Cardiovascular: Limbs warm and well perfused   Abdomen: Non-protruded   Musculoskeletal: Muscle bulk symmetric, no atrophy, no gross deformities   · Lumbar Spine: ROM WNL. Lumbar paraspinals non-tender to palpation bilaterally. SLR neg bilaterally. ROSEY pos bilaterally. GAENSLEN pos bilaterally. Positive facet loading bilaterally. Neurological: Cranial nerves II-XII grossly intact. · Gait - Normal, non-antalgic gait. Ambulates without assistive device. · Motor: 5/5 muscle strength in bilateral hip flexion, knee flexion, knee extension, ankle dorsiflexion, and ankle plantar flexion   · Sensory: LT sensation intact in lower limbs   · Reflexes: 2+ symmetrical in bilateral achilles, 2+ bilateral patellar  Skin: No rashes or lesions present   Psychological: Cooperative, no exaggerated pain behaviors       Assessment:    Diagnosis Orders   1. Facet hypertrophy of lumbar region        2. Lumbar spondylosis        3. Sacroiliac joint pain        4. SI (sacroiliac) joint dysfunction        5. Chronic pain syndrome               Leala Meigs is a 50 y. o.female presenting to the pain clinic for evaluation of low back pain, SI pain. She has noted axial lumbar facet mediated pain on exam and with voiced complaints. Discussed procedures to assist with pain control, Lumbar facet series targeting bilateral facet levels of L4-5,L5-S1. She would like to proceed with this series, with plans for radiofrequency ablation in the future.  She can continue Naproxen, OTC Tylneol, ice, heat, topicals, stretches and educated on home exercises. I will see her back after procedures. With significant benefit from bilateral diagnostic medial branch blocks targeting L4-5, L5-S1, I have set her up for confirmatory blocks at the same levels. We will also get images from [facility] completed this summer on her right hip. I will see her back after injections      Plan: The following treatment recommendations and plan were discussed in detail with Pricila Gold. Imaging:   I have reviewed patients imaging of lumbar XR, Lumbar MRI and results were discussed with patient today. Analgesics:    Patient is taking Acetaminophen. Patient informed that the maximum amount of acetaminophen taken on a regular basis should only be 4000 mg per day. Patient is taking naproxen. Patient is advised to take as prescribed and not take on an empty stomach. Adjuvants:   None    Interventions: In presence of lumbar axial back pain and with physical exam consistent for facetal pain, the option of confirmatory medial branch blocks targeting the facet joints at bilateral L4-5,L5-S1 was chosen. The risks and benefits were discussed in detail with the patient. Patient wants to proceed with the injection with Dr Sanam Shields    Anticoagulation/NPO Recommendations:   Patient does need to hold any medications prior to the procedure, Naproxen, NSAIDs x 4 days  Patient does not need to be NPO prior to the procedure. LOCAL only     Multidisciplinary Pain Management:   In the presence of complex, chronic, and multi-factorial pain, the importance of a multidisciplinary approach to pain management in the patients management regimen was emphasized and discussed in great detail. OBESITY: Patient is advised to seek nutrition consult to help in managing their weight to decrease its impact on pain. The patient was also advised to continue physical therapy and stretching exercises at home and cognitive behavioral and/or group therapy. Referrals:  None    Prescriptions Written This Visit:   None    Follow-up:   After procedures     It was my pleasure to evaluate Carli Pagan today. I spent over 35 minutes evaluating this patient, reviewing previous notes and images and completing documentation.        Aaron Caicedo, DO   Interventional Pain Management/PM&R   [place]

## 2023-01-12 ENCOUNTER — HOSPITAL ENCOUNTER (OUTPATIENT)
Age: 48
Setting detail: OUTPATIENT SURGERY
Discharge: HOME | End: 2023-01-12
Attending: ANESTHESIOLOGY | Admitting: ANESTHESIOLOGY
Payer: COMMERCIAL

## 2023-01-12 ENCOUNTER — APPOINTMENT (OUTPATIENT)
Dept: GENERAL RADIOLOGY | Age: 48
End: 2023-01-12
Attending: ANESTHESIOLOGY
Payer: COMMERCIAL

## 2023-01-12 VITALS
WEIGHT: 240.6 LBS | HEIGHT: 66 IN | SYSTOLIC BLOOD PRESSURE: 123 MMHG | OXYGEN SATURATION: 97 % | HEART RATE: 63 BPM | DIASTOLIC BLOOD PRESSURE: 60 MMHG | RESPIRATION RATE: 16 BRPM | BODY MASS INDEX: 38.67 KG/M2 | TEMPERATURE: 97.1 F

## 2023-01-12 LAB — PREGNANCY, URINE: NEGATIVE

## 2023-01-12 PROCEDURE — 2709999900 HC NON-CHARGEABLE SUPPLY: Performed by: ANESTHESIOLOGY

## 2023-01-12 PROCEDURE — 3209999900 FLUORO FOR SURGICAL PROCEDURES

## 2023-01-12 PROCEDURE — 81025 URINE PREGNANCY TEST: CPT

## 2023-01-12 PROCEDURE — 2500000003 HC RX 250 WO HCPCS: Performed by: ANESTHESIOLOGY

## 2023-01-12 PROCEDURE — 7100000011 HC PHASE II RECOVERY - ADDTL 15 MIN: Performed by: ANESTHESIOLOGY

## 2023-01-12 PROCEDURE — 7100000010 HC PHASE II RECOVERY - FIRST 15 MIN: Performed by: ANESTHESIOLOGY

## 2023-01-12 PROCEDURE — 3600000056 HC PAIN LEVEL 4 BASE: Performed by: ANESTHESIOLOGY

## 2023-01-12 RX ORDER — LIDOCAINE HYDROCHLORIDE 10 MG/ML
INJECTION, SOLUTION EPIDURAL; INFILTRATION; INTRACAUDAL; PERINEURAL PRN
Status: DISCONTINUED | OUTPATIENT
Start: 2023-01-12 | End: 2023-01-12 | Stop reason: ALTCHOICE

## 2023-01-12 RX ORDER — BUPIVACAINE HYDROCHLORIDE 2.5 MG/ML
INJECTION, SOLUTION EPIDURAL; INFILTRATION; INTRACAUDAL PRN
Status: DISCONTINUED | OUTPATIENT
Start: 2023-01-12 | End: 2023-01-12 | Stop reason: ALTCHOICE

## 2023-01-12 ASSESSMENT — PAIN DESCRIPTION - LOCATION: LOCATION: BACK

## 2023-01-12 ASSESSMENT — PAIN DESCRIPTION - PAIN TYPE: TYPE: CHRONIC PAIN

## 2023-01-12 ASSESSMENT — PAIN DESCRIPTION - ORIENTATION: ORIENTATION: LOWER

## 2023-01-12 ASSESSMENT — PAIN - FUNCTIONAL ASSESSMENT: PAIN_FUNCTIONAL_ASSESSMENT: 0-10

## 2023-01-12 ASSESSMENT — PAIN SCALES - GENERAL: PAINLEVEL_OUTOF10: 7

## 2023-01-13 NOTE — PRE SEDATION
[address] [address]  Pre-Sedation/Analgesia History & Physical    Pt Name: Zackary Cesar  MRN: [id number]  YOB: 1975  Provider Performing Procedure: Deena Burgess DO   Primary Care Physician: Giovani Obregon PA-C      MEDICAL HISTORY       has a past medical history of Asthma, Diabetes mellitus ([facility] [address] Heterozygous MTHFR mutation [id number], Hyperlipidemia, Polycystic ovarian disease, Sleep apnea, and Thyroid disease. SURGICAL HISTORY   has a past surgical history that includes Bunionectomy (Right, 2011); Upper gastrointestinal endoscopy (over 5 years ago ?); US BREAST BIOPSY W LOC DEVICE 1ST LESION LEFT (Left, Feb 10/2015); US BREAST BIOPSY W LOC DEVICE 1ST LESION RIGHT (Right, 2020); Breast surgery (Left, 2/26/15); and Pain management procedure (Bilateral, 10/19/2022). ALLERGIES   Allergies as of 11/02/2022 - Fully Reviewed 11/02/2022   Allergen Reaction Noted    Molds & smuts  12/10/2012    Penicillins Other (See Comments) 10/24/2013       MEDICATIONS   Prior to Admission medications    Medication Sig Start Date End Date Taking?  Authorizing Provider   diphenhydrAMINE (BENADRYL) 25 MG tablet Take 25 mg by mouth every 6 hours as needed for Itching    Historical Provider, MD   naproxen (NAPROSYN) 500 MG tablet Take 500 mg by mouth 2 times daily (with meals)    Historical Provider, MD   metoprolol tartrate (LOPRESSOR) 50 MG tablet Take 50 mg by mouth 2 times daily    Historical Provider, MD   CONTRAVE 8-90 MG per extended release tablet  10/12/21   Historical Provider, MD   Cyanocobalamin (B-12 PO) Take by mouth    Historical Provider, MD   Cholecalciferol (RA VITAMIN D-3 PO) Take by mouth    Historical Provider, MD   omeprazole (PRILOSEC) 20 MG delayed release capsule Take 20 mg by mouth daily    Historical Provider, MD   Rosuvastatin Calcium (CRESTOR PO) Take 5 mg by mouth    Historical Provider, MD   levothyroxine (SYNTHROID) 125 MCG tablet Take 150 mcg by mouth Daily    Historical Provider, MD   fluticasone-salmeterol (ADVAIR) 250-50 MCG/DOSE AEPB Inhale 1 puff into the lungs daily as needed. Historical Provider, MD   Multiple Vitamins-Minerals (WOMENS ONE DAILY PO) Take 1 tablet by mouth daily. Historical Provider, MD   norgestimate-ethinyl estradiol (ORTHO-CYCLEN) 0.25-35 MG-MCG per tablet Take 1 tablet by mouth daily. Historical Provider, MD   metformin (GLUCOPHAGE) 500 MG tablet Take 1,000 mg by mouth 2 times daily (with meals). Historical Provider, MD   ferrous sulfate 325 (65 FE) MG tablet Take 325 mg by mouth daily (with breakfast). Historical Provider, MD   FLUoxetine (PROZAC) 20 MG capsule Take 40 mg by mouth daily    Historical Provider, MD   Omega-3 Fatty Acids (FISH OIL) 1200 MG CAPS Take 1 tablet by mouth daily. Historical Provider, MD   folic acid (FOLVITE) 155 MCG tablet Take 800 mcg by mouth daily    Historical Provider, MD   ValACYclovir HCl (VALTREX PO) Take 1 tablet by mouth daily as needed. Historical Provider, MD   Albuterol Sulfate (PROAIR HFA IN) Inhale 2 puffs into the lungs daily as needed. Historical Provider, MD     PHYSICAL:   Vitals:    01/12/23 1328   BP: 123/60   Pulse: 63   Resp: 16   Temp: 97.1 °F (36.2 °C)   SpO2: 97%     PLANNED PROCEDURE   See procedure note  SEDATION  Planned agent: Versed and Fentanyl  ASA Classification: 2  Class 1: A normal healthy patient  Class 2: Pt with mild to moderate systemic disease  Class 3: Severe systemic disease or disturbance  Class 4: Severe systemic disorders that are already life threatening. Class 5: Moribund pt with little chances of survival, for more than 24 hours. Mallampati I Airway Classification: 2    1. Pre-procedure diagnostic studies complete and results available. 2. Previous sedation/anesthesia experiences assessed. 3. The patient is an appropriate candidate to undergo the planned procedure sedation and anesthesia. (Refer to nursing sedation/analgesia documentation record)  4. Formulation and discussion of sedation/procedure plan, risks, and expectations with patient and/or responsible adult completed. 5. Patient examined immediately prior to the procedure.  (Refer to nursing sedation/analgesia documentation record)    Edgar Staley DO  Electronically signed 1/12/2023 at 8:31 PM

## 2023-01-13 NOTE — PROCEDURES
Pre-operative Diagnosis: Lumbar facet pain     Post-operative Diagnosis: Lumbar facet pain     Procedure: BIlateral lumbar medial branch blocks targeting facet joints of L4/L5 and L5/S1     Procedure Description:  After consent was obtained, the patient was placed in the prone position with a pillow under the abdomen to reduce the lordotic curve of the lumbar spine. The lower back was prepped with chloraprep and draped in a sterile fashion. Then, 0.5 cc of 1 % lidocaine was used for local anesthesia of the skin and superficial subcutaneous tissues. Three 22-gauge 3.5-inch spinal needles were advanced under fluoroscopy in an AP view: one at the sacral ala and two at the junction of the right superior articular process and the transverse process of the L4 and L5 vertebrae. There was no paresthesias, heme, or CSF obtained. Needle placement was confirmed using AP and oblique views. Then, 0.5 cc of 0.5% bupivacaine was injected at each site without complications. The procedure was repeated on the contralateral side. The patient tolerated the procedure well, transported to the recovery room, and discharged in ambulatory fashion.      Procedural Complications: None  Estimated Blood Loss: 0 mL        Aaron Thomas DO  Interventional Pain Management/PM&R   . [facility]

## 2023-01-13 NOTE — POST SEDATION
[address] [address]  Sedation/Analgesia Post Sedation Record    Pt Name: Araceli Babinski  MRN: [id number]  YOB: 1975  Procedure Performed By: Jordon Monroy DO  Primary Care Physician: Leonard Reyes PA-C    POST-PROCEDURE    Physicians/Assistants: Jordon Monroy DO  Procedure Performed: See Procedure Note   Sedation/Anesthesia: Versed and Fentanyl (See procedure note for amount and duration)  Estimated Blood Loss:     0  ml  Specimens Removed: None        Complications: None           Aaron Johnson DO  Electronically signed 1/12/2023 at 8:31 PM

## 2023-01-17 ENCOUNTER — OFFICE VISIT (OUTPATIENT)
Dept: PHYSICAL MEDICINE AND REHAB | Age: 48
End: 2023-01-17

## 2023-01-17 VITALS
BODY MASS INDEX: 38.57 KG/M2 | HEIGHT: 66 IN | WEIGHT: 240 LBS | DIASTOLIC BLOOD PRESSURE: 76 MMHG | SYSTOLIC BLOOD PRESSURE: 128 MMHG

## 2023-01-17 DIAGNOSIS — M53.3 SACROILIAC JOINT PAIN: ICD-10-CM

## 2023-01-17 DIAGNOSIS — M53.3 SI (SACROILIAC) JOINT DYSFUNCTION: ICD-10-CM

## 2023-01-17 DIAGNOSIS — M47.816 FACET HYPERTROPHY OF LUMBAR REGION: Primary | ICD-10-CM

## 2023-01-17 DIAGNOSIS — M47.816 LUMBAR SPONDYLOSIS: ICD-10-CM

## 2023-01-17 DIAGNOSIS — G89.4 CHRONIC PAIN SYNDROME: ICD-10-CM

## 2023-01-17 RX ORDER — NAPROXEN 500 MG/1
500 TABLET ORAL 2 TIMES DAILY WITH MEALS
Qty: 60 TABLET | Refills: 1 | Status: SHIPPED | OUTPATIENT
Start: 2023-01-17

## 2023-01-17 NOTE — Clinical Note
Patient is requesting if we can do her Lumbar RFAs bilaterally as she has limited time off and helps take care of her parents.  Are you ok with doing bilateral ?

## 2023-01-17 NOTE — PROGRESS NOTES
Chronic Pain/PM&R Clinic Note     Encounter Date: 1/17/23    Subjective:   Chief Complaint:   No chief complaint on file. History of Present Illness:   Robert Palacios is a 50 y.o. female seen in the clinic initially on 09/26/2022 upon request from Dr Ave aMchado for her history of SI, low back pain. She has a personal medical history of GROVER, asthma, obesity, pre-diabetic, anxiety, depression. Patient presents with complaints of bilateral low back pain, right is the worst, does go into the buttocks and occasionally does have some thigh pain. She denies pain going into the legs or feet. She describes the pain as a constant dull ache. She reports pain for 18 + months. She fell on the ice in 2021, no fractures that she knew of, and no significant back pain at that time. Then she went to [facility], walked a lot, and then noticed the back pain. She has tried chiropractor, massage, physical therapy with relief, OTCs, topicals with mild relief. Pain is increased walking, standing, up.down stairs, change in position, sitting to long. Pain is alleviated by heat, naproxen. Mornings are the worst, she feels stiff but pain does continue throughout the day. She tries to do exercises at home, but minimally. 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 Denies any numbness, tingling, loss of bowel or bladder. She reports [facility] did give her a intramuscular injection with no relief. Pain scale : at worst pain is 9/10, at best pain is 2/10. Today, 11/02/2022, patient presents for planned follow up. She completed bilateral lumbar medical branch blocks at L4-5, L5-S1 with Dr Lola Hernandez on 10/19/2022. Procedure went well, she states she had relief for about 5 days. She reports she was able to walk for longer distance, do home exercises with pain at a more tolerable levels. She would like to continue with there series.  She denies any new numbness, tingling, loss of bowel or bladder. She is also asking if we received images and notes from [address] [place] [address] regarding her right hip pain, she was told she could possibly get injections with us in the future there. Today, 2023, patient presents for planned procedure follow-up. She completed confirmatory bilateral lumbar medial branch blocks targeting facet joints of L4-5, L5-S1 with Dr. Bhumika Carter on 2023. Procedure was a little more uncomfortable but she felt that her pain was resolved for about 4-5 days, she feels she had about 95% relief at that time. She was able to tolerate doing housework, walking, activity with pain much more controlled. She states she then noticed pain started to come back, and was having difficulty even loading her . She is interested in completing radiofrequency ablations at same levels. She is asking about doing them bilaterally as she does not have much time off from work. She does note right-side is worse than left. She denies any new numbness, tingling, loss of bowel bladder control    History of Interventions:   Surgery: No previous lumbar surgeries  Injections: b/l MBB L4-5, L5-S1 (10/19/22), (2023)- relief x 5 days     Current Treatment Medications:   Naproxen- relief    Historical Treatment Medications:   Tylenol- with relief  Advil- with relief    Imaging:    Ordering Provider: Cee Crawford          Radiology Department  Patient:  Nicolasa Christensenabarnoldo. :  1975   Sex: Kris, [facility]  Location:  [address] [place] [address]   Unit #:   [id number]       Acct #:  [de-identified]       Ordering Phys: LOY Quinones PA-C            Exam Date: 22     Accession #:  [id number]     Exam:  MRI   MRI Lumbar Spine w/o Contrast     Result: See Report            EXAM:  MR LUMBAR SPINE WITHOUT INTRAVENOUS CONTRAST          CLINICAL INDICATION:  LOW BACK PAIN X1 YEAR -- ASSOCIATED RIGHT HIP PAIN     -- FALL INJURY 2021 TECHNIQUE:  Multiplanar and multisequence MR images of the lumbar spine     without intravenous contrast.  This report was created using Coveroo     report generation technology. COMPARISON:  None. FINDINGS:          VERTEBRAE:  Unremarkable. Vertebral body heights are preserved. Normal     vertebral bodies and posterior elements. Normal alignment. No     spondylolisthesis. There is preservation of the normal lumbar lordosis. SPINAL CORD:  Unremarkable. Normal position and signal intensity of the     conus medullaris. SOFT TISSUES:  Unremarkable. DISCS/SPINAL CANAL/NEURAL FORAMINA:          L1-L2:  Unremarkable. Normal disc height and morphology. Normal spinal     canal and lateral recess. Normal neuroforamina. L2-L3:  Unremarkable. Normal disc height and morphology. Normal spinal     canal and lateral recesses. Normal neuroforamina. L3-L4:  Unremarkable. Normal disc height and morphology. Facet     arthropathy causes mild impression on the thecal sac but no significant     spinal stenosis. Normal neuroforamina. L4-L5:  Unremarkable. Normal disc height and morphology. Facet     arthropathy causes mild impression on thecal sac but no significant spinal     stenosis. Normal neuroforamina. L5-S1:  Unremarkable. Normal disc height and morphology. Facet     arthropathy causes mild impression on the thecal sac but no significant     spinal stenosis. Normal neuroforamina. IMPRESSION:     Facet arthropathy at L3-4, L4-5 and L5-S1 minor impression on the thecal     sac. Otherwise normal MRI lumbar spine. Electronically Signed:     Munira Woodall.  MD [place]     2022/06/20 at 14:38 EDT     Reading Location ID and State: [facility]     Tel [phone], Service support  [phone], Fax [phone]                   Past Medical History:   Diagnosis Date    Asthma     Diabetes mellitus ([facility] Heterozygous MTHFR mutation [id number]     has half gene from mother     Hyperlipidemia     Polycystic ovarian disease     Sleep apnea     Thyroid disease        Past Surgical History:   Procedure Laterality Date    BREAST SURGERY Left 2/26/15    Dr. Britt Gibbons Bilateral 10/19/2022    medial branch blocks bilateral Lumbar4-5, 5-Sacral 1 performed by Amanda Salazar DO at [facility] Bilateral 1/12/2023    medial branch blocks targeting the facet joints at bilateral Lumbar 4-5,5-Sacral 1 performed by Amanda Salazar DO at [address] [place] [address]  over 5 years ago ? US BREAST NEEDLE BIOPSY LEFT Left Feb 10/2015    [facility]    US BREAST NEEDLE BIOPSY RIGHT Right 2020       Family History   Problem Relation Age of Onset    High Blood Pressure Father     High Cholesterol Father     Cancer Paternal Uncle 76        pancreatic    Diabetes Maternal Grandmother     Cancer Paternal Grandfather [de-identified]        pancreatic    Cancer Maternal Aunt 79        pancreatic    Breast Cancer Paternal Cousin         >50    Cancer Paternal Uncle 76        pancreatic    Breast Cancer Paternal Cousin         >50    Asthma Neg Hx     Kidney Disease Neg Hx          Medications & Allergies:   Current Outpatient Medications   Medication Instructions    Albuterol Sulfate (PROAIR HFA IN) 2 puffs, Inhalation, DAILY PRN    Cholecalciferol (RA VITAMIN D-3 PO) Oral    CONTRAVE 8-90 MG per extended release tablet No dose, route, or frequency recorded.     Cyanocobalamin (B-12 PO) Oral    diphenhydrAMINE (BENADRYL) 25 mg, Oral, EVERY 6 HOURS PRN    ferrous sulfate (IRON 325) 325 mg, DAILY WITH BREAKFAST    FLUoxetine (PROZAC) 40 mg, Oral, DAILY,      fluticasone-salmeterol (ADVAIR) 250-50 MCG/DOSE AEPB 1 puff, Inhalation, DAILY PRN    folic acid (FOLVITE) 282 mcg, Oral, DAILY,      levothyroxine (SYNTHROID) 150 mcg, Oral, DAILY    metFORMIN (GLUCOPHAGE) 1,000 mg, Oral, 2 TIMES DAILY WITH MEALS    metoprolol tartrate (LOPRESSOR) 50 mg, Oral, 2 TIMES DAILY    Multiple Vitamins-Minerals (WOMENS ONE DAILY PO) 1 tablet, DAILY    naproxen (NAPROSYN) 500 mg, Oral, 2 TIMES DAILY WITH MEALS    norgestimate-ethinyl estradiol (ORTHO-CYCLEN) 0.25-35 MG-MCG per tablet 1 tablet, DAILY    Omega-3 Fatty Acids (FISH OIL) 1200 MG CAPS 1 tablet, Oral, DAILY    omeprazole (PRILOSEC) 20 mg, Oral, DAILY    Rosuvastatin Calcium (CRESTOR PO) 5 mg, Oral    ValACYclovir HCl (VALTREX PO) 1 tablet, Oral, DAILY PRN       Allergies   Allergen Reactions    Molds & Smuts      Through allergy testing    Penicillins Other (See Comments)     Unsure of reaction-? Really allergic       Review of Systems:   Constitutional: negative for weight changes or fevers  Genitourinary: negative for bowel/bladder incontinence   Musculoskeletal: positive for low back pain, SI pain, right hip pain   Neurological: negative for any leg weakness or numbness/tingling  Behavioral/Psych: negative for anxiety/depression   All other systems reviewed and are negative    Objective: There were no vitals filed for this visit. Constitutional: Pleasant, no acute distress   Head: Normocephalic, atraumatic   Eyes: Conjunctivae normal   Neck: Supple, symmetrical   Lungs: Normal respiratory effort, non-labored breathing   Cardiovascular: Limbs warm and well perfused   Abdomen: Non-protruded   Musculoskeletal: Muscle bulk symmetric, no atrophy, no gross deformities   · Lumbar Spine: ROM WNL. Lumbar paraspinals non-tender to palpation bilaterally. SLR neg bilaterally. ROSEY pos bilaterally. GAENSLEN pos bilaterally. Positive facet loading bilaterally. Neurological: Cranial nerves II-XII grossly intact. · Gait - Normal, non-antalgic gait. Ambulates without assistive device.    · Motor: 5/5 muscle strength in bilateral hip flexion, knee flexion, knee extension, ankle dorsiflexion, and ankle plantar flexion   · Sensory: LT sensation intact in lower limbs   · Reflexes: 2+ symmetrical in bilateral achilles, 2+ bilateral patellar  Skin: No rashes or lesions present   Psychological: Cooperative, no exaggerated pain behaviors       Assessment:    Diagnosis Orders   1. Facet hypertrophy of lumbar region        2. Lumbar spondylosis        3. Sacroiliac joint pain        4. SI (sacroiliac) joint dysfunction        5. Chronic pain syndrome            Lane Caballero is a 50 y. o.female presenting to the pain clinic for evaluation of low back pain, SI pain. She has noted axial lumbar facet mediated pain on exam and with voiced complaints. Discussed procedures to assist with pain control, Lumbar facet series targeting bilateral facet levels of L4-5,L5-S1. She would like to proceed with this series, with plans for radiofrequency ablation in the future. She can continue Naproxen, OTC Tylneol, ice, heat, topicals, stretches and educated on home exercises. I will see her back after procedures. With significant benefit from bilateral diagnostic medial branch blocks targeting L4-5, L5-S1, I have set her up for confirmatory blocks at the same levels. We will also get images from [facility] completed this summer on her right hip. I will see her back after injections    With significant benefit from confirmatory medial branch blocks targeting bilateral L4-5, L5-S1, I have set her up for radiofrequency ablation targeting the same levels. I have also sent in a refill for her naproxen. I will see her back after procedures      Plan: The following treatment recommendations and plan were discussed in detail with Lane Caballero. Imaging:   I have reviewed patients imaging of lumbar XR, Lumbar MRI and results were discussed with patient today. Analgesics:    Patient is taking Acetaminophen. Patient informed that the maximum amount of acetaminophen taken on a regular basis should only be 4000 mg per day.      Adjuvants: Patient is taking naproxen. Patient is advised to take as prescribed and not take on an empty stomach. Refill sent to pharmacy     Interventions: In presence of lumbar axial back pain and with physical exam consistent for facetal pain, the option of lumbar radiofrequency ablations targeting the facet joints at bilateral L4-5,L5-S1 was chosen. The risks and benefits were discussed in detail with the patient. Patient wants to proceed with the injection with Dr Federico Riojas    Anticoagulation/NPO Recommendations:   Patient does need to hold any medications prior to the procedure- Naproxen, NSAIDs x 4 days  Patient does not need to be NPO prior to the procedure. IV Anesthesia     Multidisciplinary Pain Management:   In the presence of complex, chronic, and multi-factorial pain, the importance of a multidisciplinary approach to pain management in the patients management regimen was emphasized and discussed in great detail. OBESITY: Patient is advised to seek nutrition consult to help in managing their weight to decrease its impact on pain. The patient was also advised to continue physical therapy and stretching exercises at home and cognitive behavioral and/or group therapy. Referrals:  None    Prescriptions Written This Visit:   Naproxen 500 mg BID PRN     Follow-up:   After procedures     It was my pleasure to evaluate Erin Perry today. I spent over 35 minutes evaluating this patient, reviewing previous notes and images and completing documentation.        Jazmin Camargo, CLEMENTE - CNP   Interventional Pain Management/PM&R   [place]

## 2023-01-17 NOTE — PATIENT INSTRUCTIONS
Learning About Medial Branch Block and Neurotomy  What are medial branch block and neurotomy? Facet joints connect your vertebrae to each other. Problems in these joints can cause chronic (long-term) pain in the neck or back. Medial branch nerves are the nerves that carry many of the pain messages from your facet joints. Radiofrequency medial branch neurotomy is a type of medial branch neurotomy that is used to relieve arthritis pain. It uses radio waves to damage nerves in your neck or back so that they can no longer send pain messages to your brain. Before your doctor knows if a neurotomy will help you, you will get a medial branch block to find out if certain nerves are the ones that are a source of your pain. You will need two separate visits to the outpatient center or hospital to have both procedures. You will need someone to drive you home. How is a medial branch block done? The doctor will use a tiny needle to numb the skin where you will get the block. Then the doctor puts the block needle into the numbed area. You may feel some pressure, but you should not feel pain. Using fluoroscopy (live X-ray) to guide the needle, the doctor injects medicine onto one or more nerves to make them numb. If you get relief from your pain in the next 4 to 6 hours, it's a sign that those nerves may be contributing to your pain. The relief will last only a short time. You may then have a medial branch neurotomy at a later visit to try to get longer relief. How is a medial branch neurotomy done? The doctor will use a tiny needle to numb the skin where you will get the neurotomy. Then the doctor puts the neurotomy needle into the numbed area. You may feel some pressure. Using fluoroscopy (live X-ray) to guide the needle, the doctor sends radio waves through the needle to the nerve for 60 to 90 seconds. The radio waves heat the nerve, which damages it. The doctor may do this several times.  And more than one nerve may be treated. How long do medial branch block and neurotomy take? It takes 20 to 30 minutes to get the block. You can go home after the doctor watches you for about an hour. It takes 45 to 90 minutes to get a neurotomy, depending on how many nerves are heated. You will probably go home 30 to 60 minutes after the procedure. What can you expect after a neurotomy? You will get instructions on how to report how much pain you have when you are at home. You may feel a little sore or tender at the injection site at first. But after a successful neurotomy, most people have pain relief right away. It often lasts for several months, but your pain may come back. If your pain does come back, it may mean that the damaged nerve has healed and can send pain messages again. Or it can mean that a different nerve is causing pain. Your doctor will discuss your options with you. Follow-up care is a key part of your treatment and safety. Be sure to make and go to all appointments, and call your doctor if you are having problems. It's also a good idea to know your test results and keep a list of the medicines you take. Where can you learn more? Go to [email or web address].InstaMed. org and sign in to your [facility] account. Enter N090 in the [facility] box to learn more about \"Learning About Medial Branch Block and Neurotomy. \"     If you do not have an account, please click on the \"Sign Up Now\" link. Current as of: April 8, 2021               Content Version: 13.1  © 2006-2021 [employer]orporated. Care instructions adapted under license by [facility] ([facility]). If you have questions about a medical condition or this instruction, always ask your healthcare professional. [address] any warranty or liability for your use of this information.

## 2023-01-27 ENCOUNTER — PREP FOR PROCEDURE (OUTPATIENT)
Dept: PHYSICAL MEDICINE AND REHAB | Age: 48
End: 2023-01-27

## 2023-01-30 NOTE — DISCHARGE INSTRUCTIONS
Post procedure Instructions:    No driving or making significant decisions for the remainder of the day. Be cautions with walking and activity for 24 hours, do not over exert yourself. Ok to resume pre-procedure activity level today. Apply ice to site of injection site if you have pain or discomfort. Do not submerge sit of injection during bath or pool activities for 48 hours post-procedure. Resume blood thinning medications in 24 hours. Call office [phone] if you have:  Temperature greater than 100.4  Persistent nausea and vomiting  Severe uncontrolled pain  Redness, tenderness, or signs of infection (pain, swelling, redness, odor or green/yellow discharge around the site)  Difficulty breathing, headache or visual disturbances  Hives  Persistent dizziness or light-headedness  Extreme fatigue  Any other questions or concerns you may have after discharge    In an emergency, call 911 or go to an Emergency Department at a nearby hospital    Surgical Site Infections      How can we work together to prevent Surgical Site Infections? We would like to thank you for choosing [facility] for your Surgical Care. Below you will find helpful information on how we can work together to prevent Surgical Site Infections. What is a Surgical Site Infection (SSI)? A surgical site infection is an infection that occurs after surgery in the part of the body where the surgery took place. Most patients who have surgery do not develop an infection. However, infections develop in about 1 to 3 out of every 100 patients who have surgery. Some of the common symptoms of a surgical site infection are:  Redness and pain around the area where you had surgery  Drainage of cloudy fluid from your surgical wound  Fever    Can SSIs be treated? Yes. Most surgical site infections can be treated with antibiotics. The antibiotic given to you depends on the bacteria (germs) causing the infection.  Sometimes patients with SSIs also need another surgery to treat the infection. What are some of the things that hospitals are doing to prevent SSIs? To prevent SSIs, doctors, nurses, and other healthcare providers: May remove some of your hair immediately before your surgery using electric clippers if the hair is in the same area where the procedure will occur. They should not shave you with a razor. Give you antibiotics before your surgery starts. In most cases, you should get antibiotics within 60 minutes before the surgery starts and the antibiotics should be stopped within 24 hours after surgery. Clean the skin at the site of your surgery with a special soap that kills germs. Clean their hands and arms up to their elbows with an antiseptic agent just before the surgery. Wear special hair covers, masks, gowns, and gloves during surgery to  keep the surgery area clean. Clean their hands with soap and water or an alcohol-based hand rub before and after caring for each patient. If you do not see your providers clean their hands, please     ask  them to do so. What can I do to help prevent SSIs? Before your surgery:  Tell your doctor about other medical problems you may have. Health problems such as allergies, diabetes, and obesity could affect your surgery and your treatment. Quit smoking. Patients who smoke get more infections. Talk to your doctor about how you can quit before your surgery. Do not shave near where you will have surgery. Shaving with a razor can irritate your skin and make it easier to develop an infection. At the time of your surgery:  Speak up if someone tries to shave you with a razor before surgery. Ask why you need to be shaved and talk with your surgeon if you have any concerns. Ask if you will get antibiotics before surgery.   After your surgery:  Make sure that your healthcare providers clean their hands before examining you, either with soap and water or an alcohol-based hand rub.  Family and friends who visit you should not touch the surgical wound or dressings. Family and friends should clean their hands with soap and water or an alcohol-based hand rub before and after visiting you. If you do not see them clean their hands, ask them to clean their hands. What do I need to do when I go home from the hospital?  Before you go home, your doctor or nurse should explain everything you need to know about taking care of your wound. Make sure you understand how to care for your wound before you leave the hospital.  Always clean your hands before and after caring for your wound. Before you go home, make sure you know who to contact if you have questions or problems after you get home. If you have any symptoms of an infection, such as redness and pain at the surgery site, drainage, or fever, call your doctor immediately. If you have additional questions, please ask your doctor or nurse.

## 2023-01-31 NOTE — H&P
Today, patient presents for planned lumbar radiofrequency ablations targeting the facet joints at bilateral L4-5,L5-S1    This note is reflective of the patient's previous visit for evaluation. We will proceed with today's planned procedure. Since patient's last visit for evaluation, there have been no interval changes in medical history. Patient has no new numbness, weakness, or focal neurological deficit since evaluation. Patient has no contraindications to injection (no anticoagulation or recent antibiotic intake for active infections), and has a  present or is able to drive themselves (as discussed and cleared by physician). Allergies to latex, contrast dye, and steroid medications have been confirmed with the patient prior to the procedure. NPO necessity has been assessed and accepted based on procedure complexity. The risks and benefits of the procedure have been explained including but are not limited to infection, bleeding, paralysis, immediate post procedure weakness, and dizziness; the patient acknowledges understanding and desires to proceed with the procedure. Patient has signed consent for same procedure as discussed in previous clinic encounter. All other questions and concerns were addressed at bedside. See procedure note for full details. Post procedure Instructions: The patient was advised not to drive during the day of the procedure and not to engage in any significant decision making (unless otherwise states by physician). The patient was also advised to be cautious with walking/activity for 24 hours following today's visit and asked not to engage in over-exertion (unless otherwise states by physician). After this time, it is ok to resume pre-procedure level of activity. Patient advised to apply ice to site of injection in situations of pain and discomfort. Patient advised to not submerge site of injection during bath or pool activities for approximately 24 hours post-procedure. Patient attested to understanding post procedure directions / restrictions. All other questions and concerns addressed before patient discharge in ambulatory fashion.       Chronic Pain/PM&R Clinic Note     Encounter Date: 1/17/23    Subjective:   Chief Complaint:   No chief complaint on file.      History of Present Illness:   Cleo Camacho is a 47 y.o. female seen in the clinic initially on 09/26/2022 upon request from Dr Aguila for her history of SI, low back pain. She has a personal medical history of GROVER, asthma, obesity, pre-diabetic, anxiety, depression.     Patient presents with complaints of bilateral low back pain, right is the worst, does go into the buttocks and occasionally does have some thigh pain. She denies pain going into the legs or feet. She describes the pain as a constant dull ache. She reports pain for 18 + months. She fell on the ice in 2021, no fractures that she knew of, and no significant back pain at that time. Then she went to [place], walked a lot, and then noticed the back pain. She has tried chiropractor, massage, physical therapy with relief, OTCs, topicals with mild relief. Pain is increased walking, standing, up.down stairs, change in position, sitting to long. Pain is alleviated by heat, naproxen. Mornings are the worst, she feels stiff but pain does continue throughout the day. She tries to do exercises at home, but minimally. 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 Denies any numbness, tingling, loss of bowel or bladder. She reports [facility] did give her a intramuscular injection with no relief. Pain scale : at worst pain is 9/10, at best pain is 2/10.     Today, 11/02/2022, patient presents for planned follow up. She completed bilateral lumbar medical branch blocks at L4-5, L5-S1 with Dr Thomas on 10/19/2022. Procedure went well, she states she had relief for about 5 days. She reports she was able to walk for longer  distance, do home exercises with pain at a more tolerable levels. She would like to continue with there series. She denies any new numbness, tingling, loss of bowel or bladder. She is also asking if we received images and notes from [address] [place] [address] regarding her right hip pain, she was told she could possibly get injections with us in the future there. Today, 2023, patient presents for planned procedure follow-up. She completed confirmatory bilateral lumbar medial branch blocks targeting facet joints of L4-5, L5-S1 with Dr. Alba Segovia on 2023. Procedure was a little more uncomfortable but she felt that her pain was resolved for about 4-5 days, she feels she had about 95% relief at that time. She was able to tolerate doing housework, walking, activity with pain much more controlled. She states she then noticed pain started to come back, and was having difficulty even loading her . She is interested in completing radiofrequency ablations at same levels. She is asking about doing them bilaterally as she does not have much time off from work. She does note right-side is worse than left. She denies any new numbness, tingling, loss of bowel bladder control    History of Interventions:   Surgery: No previous lumbar surgeries  Injections: b/l MBB L4-5, L5-S1 (10/19/22), (2023)- relief x 5 days     Current Treatment Medications:   Naproxen- relief    Historical Treatment Medications:   Tylenol- with relief  Advil- with relief    Imaging:    Ordering Provider: Cee Crawford          Radiology Department  Patient:  Nixon Thrasherciabcolumba. :  1975   Sex: Kris, [facility]wn[facility] [place]  Location:  [address] [place] [address]   Unit #:   [id number]       Acct #:  [de-identified]       Ordering Phys: Rosa Castro PA-C            Exam Date: 22     Accession #:  [id number]     Exam:  MRI   MRI Lumbar Spine w/o Contrast     Result: See Report            EXAM:  MR LUMBAR SPINE WITHOUT INTRAVENOUS CONTRAST          CLINICAL INDICATION:  LOW BACK PAIN X1 YEAR -- ASSOCIATED RIGHT HIP PAIN     -- FALL INJURY FEB 2021          TECHNIQUE:  Multiplanar and multisequence MR images of the lumbar spine     without intravenous contrast.  This report was created using [employer]     report generation technology. COMPARISON:  None. FINDINGS:          VERTEBRAE:  Unremarkable. Vertebral body heights are preserved. Normal     vertebral bodies and posterior elements. Normal alignment. No     spondylolisthesis. There is preservation of the normal lumbar lordosis. SPINAL CORD:  Unremarkable. Normal position and signal intensity of the     conus medullaris. SOFT TISSUES:  Unremarkable. DISCS/SPINAL CANAL/NEURAL FORAMINA:          L1-L2:  Unremarkable. Normal disc height and morphology. Normal spinal     canal and lateral recess. Normal neuroforamina. L2-L3:  Unremarkable. Normal disc height and morphology. Normal spinal     canal and lateral recesses. Normal neuroforamina. L3-L4:  Unremarkable. Normal disc height and morphology. Facet     arthropathy causes mild impression on the thecal sac but no significant     spinal stenosis. Normal neuroforamina. L4-L5:  Unremarkable. Normal disc height and morphology. Facet     arthropathy causes mild impression on thecal sac but no significant spinal     stenosis. Normal neuroforamina. L5-S1:  Unremarkable. Normal disc height and morphology. Facet     arthropathy causes mild impression on the thecal sac but no significant     spinal stenosis. Normal neuroforamina. IMPRESSION:     Facet arthropathy at L3-4, L4-5 and L5-S1 minor impression on the thecal     sac. Otherwise normal MRI lumbar spine. Electronically Signed:     Mabel Briggs MD     2022/06/20 at 14:38 EDT     Reading Location ID and State: [place] / [place]     Tel [phone], Service support  [phone], Fax [phone]                   Past Medical History:   Diagnosis Date    Asthma     Diabetes mellitus ([facility] [address]     Heterozygous MTHFR mutation [id number]     has half gene from mother     Hyperlipidemia     Polycystic ovarian disease     Sleep apnea     Thyroid disease        Past Surgical History:   Procedure Laterality Date    BREAST SURGERY Left 2/26/15    Dr. Oly Yao Bilateral 10/19/2022    medial branch blocks bilateral Lumbar4-5, 5-Sacral 1 performed by Tianna Whitten DO at [facility] Bilateral 1/12/2023    medial branch blocks targeting the facet joints at bilateral Lumbar 4-5,5-Sacral 1 performed by Tianna Whitten DO at [address] [place] [address]  over 5 years ago ? US BREAST BIOPSY W LOC DEVICE 1ST LESION LEFT Left Feb 10/2015    [facility]    US BREAST BIOPSY W LOC DEVICE 1ST LESION RIGHT Right 2020       Family History   Problem Relation Age of Onset    High Blood Pressure Father     High Cholesterol Father     Cancer Paternal Uncle 76        pancreatic    Diabetes Maternal Grandmother     Cancer Paternal Grandfather [de-identified]        pancreatic    Cancer Maternal Aunt 78        pancreatic    Breast Cancer Paternal Cousin         >50    Cancer Paternal Uncle 76        pancreatic    Breast Cancer Paternal Cousin         >50    Asthma Neg Hx     Kidney Disease Neg Hx          Medications & Allergies:   Current Outpatient Medications   Medication Instructions    Albuterol Sulfate (PROAIR HFA IN) 2 puffs, Inhalation, DAILY PRN    Cholecalciferol (RA VITAMIN D-3 PO) Oral    CONTRAVE 8-90 MG per extended release tablet No dose, route, or frequency recorded.     Cyanocobalamin (B-12 PO) Oral    diphenhydrAMINE (BENADRYL) 25 mg, Oral, EVERY 6 HOURS PRN    ferrous sulfate (IRON 325) 325 mg, DAILY WITH BREAKFAST    FLUoxetine (PROZAC) 40 mg, Oral, DAILY,      fluticasone-salmeterol (ADVAIR) 250-50 MCG/DOSE AEPB 1 puff, Inhalation, DAILY PRN    folic acid (FOLVITE) 062 mcg, Oral, DAILY,      levothyroxine (SYNTHROID) 150 mcg, Oral, DAILY    metFORMIN (GLUCOPHAGE) 1,000 mg, Oral, 2 TIMES DAILY WITH MEALS    metoprolol tartrate (LOPRESSOR) 50 mg, Oral, 2 TIMES DAILY    Multiple Vitamins-Minerals (WOMENS ONE DAILY PO) 1 tablet, DAILY    naproxen (NAPROSYN) 500 mg, Oral, 2 TIMES DAILY WITH MEALS    norgestimate-ethinyl estradiol (ORTHO-CYCLEN) 0.25-35 MG-MCG per tablet 1 tablet, DAILY    Omega-3 Fatty Acids (FISH OIL) 1200 MG CAPS 1 tablet, Oral, DAILY    omeprazole (PRILOSEC) 20 mg, Oral, DAILY    Rosuvastatin Calcium (CRESTOR PO) 5 mg, Oral    ValACYclovir HCl (VALTREX PO) 1 tablet, Oral, DAILY PRN       Allergies   Allergen Reactions    Molds & Smuts      Through allergy testing    Penicillins Other (See Comments)     Unsure of reaction-? Really allergic       Review of Systems:   Constitutional: negative for weight changes or fevers  Genitourinary: negative for bowel/bladder incontinence   Musculoskeletal: positive for low back pain, SI pain, right hip pain   Neurological: negative for any leg weakness or numbness/tingling  Behavioral/Psych: negative for anxiety/depression   All other systems reviewed and are negative    Objective: There were no vitals filed for this visit. Constitutional: Pleasant, no acute distress   Head: Normocephalic, atraumatic   Eyes: Conjunctivae normal   Neck: Supple, symmetrical   Lungs: Normal respiratory effort, non-labored breathing   Cardiovascular: Limbs warm and well perfused   Abdomen: Non-protruded   Musculoskeletal: Muscle bulk symmetric, no atrophy, no gross deformities   · Lumbar Spine: ROM WNL. Lumbar paraspinals non-tender to palpation bilaterally. SLR neg bilaterally. ROSEY pos bilaterally. GAENSLEN pos bilaterally. Positive facet loading bilaterally. Neurological: Cranial nerves II-XII grossly intact.    · Gait - Normal, non-antalgic gait. Ambulates without assistive device. · Motor: 5/5 muscle strength in bilateral hip flexion, knee flexion, knee extension, ankle dorsiflexion, and ankle plantar flexion   · Sensory: LT sensation intact in lower limbs   · Reflexes: 2+ symmetrical in bilateral achilles, 2+ bilateral patellar  Skin: No rashes or lesions present   Psychological: Cooperative, no exaggerated pain behaviors       Assessment:    Diagnosis Orders   1. Facet hypertrophy of lumbar region        2. Lumbar spondylosis        3. Sacroiliac joint pain        4. SI (sacroiliac) joint dysfunction        5. Chronic pain syndrome            Harshal Martinez is a 50 y. o.female presenting to the pain clinic for evaluation of low back pain, SI pain. She has noted axial lumbar facet mediated pain on exam and with voiced complaints. Discussed procedures to assist with pain control, Lumbar facet series targeting bilateral facet levels of L4-5,L5-S1. She would like to proceed with this series, with plans for radiofrequency ablation in the future. She can continue Naproxen, OTC Tylneol, ice, heat, topicals, stretches and educated on home exercises. I will see her back after procedures. With significant benefit from bilateral diagnostic medial branch blocks targeting L4-5, L5-S1, I have set her up for confirmatory blocks at the same levels. We will also get images from [facility] completed this summer on her right hip. I will see her back after injections    With significant benefit from confirmatory medial branch blocks targeting bilateral L4-5, L5-S1, I have set her up for radiofrequency ablation targeting the same levels. I have also sent in a refill for her naproxen. I will see her back after procedures      Plan: The following treatment recommendations and plan were discussed in detail with Harshal Martinez. Imaging:   I have reviewed patients imaging of lumbar XR, Lumbar MRI and results were discussed with patient today. Analgesics:    Patient is taking Acetaminophen. Patient informed that the maximum amount of acetaminophen taken on a regular basis should only be 4000 mg per day. Adjuvants:   Patient is taking naproxen. Patient is advised to take as prescribed and not take on an empty stomach. Refill sent to pharmacy     Interventions: In presence of lumbar axial back pain and with physical exam consistent for facetal pain, the option of lumbar radiofrequency ablations targeting the facet joints at bilateral L4-5,L5-S1 was chosen. The risks and benefits were discussed in detail with the patient. Patient wants to proceed with the injection with Dr Yelena Graves    Anticoagulation/NPO Recommendations:   Patient does need to hold any medications prior to the procedure- Naproxen, NSAIDs x 4 days  Patient does not need to be NPO prior to the procedure. IV Anesthesia     Multidisciplinary Pain Management:   In the presence of complex, chronic, and multi-factorial pain, the importance of a multidisciplinary approach to pain management in the patients management regimen was emphasized and discussed in great detail. OBESITY: Patient is advised to seek nutrition consult to help in managing their weight to decrease its impact on pain. The patient was also advised to continue physical therapy and stretching exercises at home and cognitive behavioral and/or group therapy. Referrals:  None    Prescriptions Written This Visit:   Naproxen 500 mg BID PRN     Follow-up:   After procedures     It was my pleasure to evaluate Manjula Leos today. I spent over 35 minutes evaluating this patient, reviewing previous notes and images and completing documentation.        Aaron Carter, DO   Interventional Pain Management/PM&R   [place]

## 2023-02-01 ENCOUNTER — APPOINTMENT (OUTPATIENT)
Dept: GENERAL RADIOLOGY | Age: 48
End: 2023-02-01
Attending: ANESTHESIOLOGY
Payer: COMMERCIAL

## 2023-02-01 ENCOUNTER — HOSPITAL ENCOUNTER (OUTPATIENT)
Age: 48
Setting detail: OUTPATIENT SURGERY
Discharge: HOME | End: 2023-02-01
Attending: ANESTHESIOLOGY | Admitting: ANESTHESIOLOGY
Payer: COMMERCIAL

## 2023-02-01 VITALS
WEIGHT: 238.6 LBS | BODY MASS INDEX: 38.35 KG/M2 | SYSTOLIC BLOOD PRESSURE: 120 MMHG | TEMPERATURE: 96.5 F | OXYGEN SATURATION: 94 % | HEIGHT: 66 IN | DIASTOLIC BLOOD PRESSURE: 74 MMHG | HEART RATE: 69 BPM | RESPIRATION RATE: 14 BRPM

## 2023-02-01 LAB — PREGNANCY, URINE: NEGATIVE

## 2023-02-01 PROCEDURE — 3600000056 HC PAIN LEVEL 4 BASE: Performed by: ANESTHESIOLOGY

## 2023-02-01 PROCEDURE — 6360000002 HC RX W HCPCS: Performed by: ANESTHESIOLOGY

## 2023-02-01 PROCEDURE — 2709999900 HC NON-CHARGEABLE SUPPLY: Performed by: ANESTHESIOLOGY

## 2023-02-01 PROCEDURE — 99153 MOD SED SAME PHYS/QHP EA: CPT | Performed by: ANESTHESIOLOGY

## 2023-02-01 PROCEDURE — 7100000010 HC PHASE II RECOVERY - FIRST 15 MIN: Performed by: ANESTHESIOLOGY

## 2023-02-01 PROCEDURE — 3209999900 FLUORO FOR SURGICAL PROCEDURES

## 2023-02-01 PROCEDURE — 7100000011 HC PHASE II RECOVERY - ADDTL 15 MIN: Performed by: ANESTHESIOLOGY

## 2023-02-01 PROCEDURE — 99152 MOD SED SAME PHYS/QHP 5/>YRS: CPT | Performed by: ANESTHESIOLOGY

## 2023-02-01 PROCEDURE — 81025 URINE PREGNANCY TEST: CPT

## 2023-02-01 PROCEDURE — 2500000003 HC RX 250 WO HCPCS: Performed by: ANESTHESIOLOGY

## 2023-02-01 PROCEDURE — 3600000057 HC PAIN LEVEL 4 ADDL 15 MIN: Performed by: ANESTHESIOLOGY

## 2023-02-01 RX ORDER — MIDAZOLAM HYDROCHLORIDE 1 MG/ML
INJECTION INTRAMUSCULAR; INTRAVENOUS PRN
Status: DISCONTINUED | OUTPATIENT
Start: 2023-02-01 | End: 2023-02-01 | Stop reason: ALTCHOICE

## 2023-02-01 RX ORDER — LIDOCAINE HYDROCHLORIDE 10 MG/ML
INJECTION, SOLUTION EPIDURAL; INFILTRATION; INTRACAUDAL; PERINEURAL PRN
Status: DISCONTINUED | OUTPATIENT
Start: 2023-02-01 | End: 2023-02-01 | Stop reason: ALTCHOICE

## 2023-02-01 RX ORDER — FENTANYL CITRATE 50 UG/ML
INJECTION, SOLUTION INTRAMUSCULAR; INTRAVENOUS PRN
Status: DISCONTINUED | OUTPATIENT
Start: 2023-02-01 | End: 2023-02-01 | Stop reason: ALTCHOICE

## 2023-02-01 RX ORDER — LIDOCAINE HYDROCHLORIDE 20 MG/ML
INJECTION, SOLUTION EPIDURAL; INFILTRATION; INTRACAUDAL; PERINEURAL PRN
Status: DISCONTINUED | OUTPATIENT
Start: 2023-02-01 | End: 2023-02-01 | Stop reason: ALTCHOICE

## 2023-02-01 ASSESSMENT — PAIN DESCRIPTION - DESCRIPTORS: DESCRIPTORS: SHARP;DULL

## 2023-02-01 ASSESSMENT — PAIN SCALES - GENERAL: PAINLEVEL_OUTOF10: 5

## 2023-02-01 ASSESSMENT — PAIN - FUNCTIONAL ASSESSMENT
PAIN_FUNCTIONAL_ASSESSMENT: PREVENTS OR INTERFERES SOME ACTIVE ACTIVITIES AND ADLS
PAIN_FUNCTIONAL_ASSESSMENT: 0-10

## 2023-02-01 ASSESSMENT — PAIN DESCRIPTION - LOCATION: LOCATION: BACK

## 2023-02-01 NOTE — PRE SEDATION
[address] [address]  Pre-Sedation/Analgesia History & Physical    Pt Name: Zackary Cesar  MRN: [id number]  YOB: 1975  Provider Performing Procedure: Deena Burgess DO   Primary Care Physician: Giovani Obregon PA-C      MEDICAL HISTORY       has a past medical history of Asthma, Diabetes mellitus ([facility] [address] Heterozygous MTHFR mutation O0917H, Hyperlipidemia, Polycystic ovarian disease, Sleep apnea, and Thyroid disease. SURGICAL HISTORY   has a past surgical history that includes Bunionectomy (Right, 2011); Upper gastrointestinal endoscopy (over 5 years ago ?); US BREAST BIOPSY W LOC DEVICE 1ST LESION LEFT (Left, Feb 10/2015); US BREAST BIOPSY W LOC DEVICE 1ST LESION RIGHT (Right, 2020); Breast surgery (Left, 2/26/15); Pain management procedure (Bilateral, 10/19/2022); and Pain management procedure (Bilateral, 1/12/2023). ALLERGIES   Allergies as of 01/17/2023 - Fully Reviewed 01/17/2023   Allergen Reaction Noted    Molds & smuts  12/10/2012    Penicillins Other (See Comments) 10/24/2013       MEDICATIONS   Prior to Admission medications    Medication Sig Start Date End Date Taking?  Authorizing Provider   naproxen (NAPROSYN) 500 MG tablet Take 1 tablet by mouth 2 times daily (with meals) 1/17/23   CLEMENTE Quinn - CNP   diphenhydrAMINE (BENADRYL) 25 MG tablet Take 25 mg by mouth every 6 hours as needed for Itching    Historical Provider, MD   metoprolol tartrate (LOPRESSOR) 50 MG tablet Take 50 mg by mouth 2 times daily    Historical Provider, MD   CONTRAVE 8-90 MG per extended release tablet  10/12/21   Historical Provider, MD   Cyanocobalamin (B-12 PO) Take by mouth    Historical Provider, MD   Cholecalciferol (RA VITAMIN D-3 PO) Take by mouth    Historical Provider, MD   omeprazole (PRILOSEC) 20 MG delayed release capsule Take 20 mg by mouth daily    Historical Provider, MD   Rosuvastatin Calcium (CRESTOR PO) Take 5 mg by mouth    Historical Provider, MD   levothyroxine (SYNTHROID) 125 MCG tablet Take 150 mcg by mouth Daily    Historical Provider, MD   fluticasone-salmeterol (ADVAIR) 250-50 MCG/DOSE AEPB Inhale 1 puff into the lungs daily as needed. Historical Provider, MD   Multiple Vitamins-Minerals (WOMENS ONE DAILY PO) Take 1 tablet by mouth daily. Historical Provider, MD   norgestimate-ethinyl estradiol (ORTHO-CYCLEN) 0.25-35 MG-MCG per tablet Take 1 tablet by mouth daily. Historical Provider, MD   metformin (GLUCOPHAGE) 500 MG tablet Take 1,000 mg by mouth 2 times daily (with meals). Historical Provider, MD   ferrous sulfate 325 (65 FE) MG tablet Take 325 mg by mouth daily (with breakfast). Historical Provider, MD   FLUoxetine (PROZAC) 20 MG capsule Take 40 mg by mouth daily    Historical Provider, MD   Omega-3 Fatty Acids (FISH OIL) 1200 MG CAPS Take 1 tablet by mouth daily. Historical Provider, MD   folic acid (FOLVITE) 057 MCG tablet Take 800 mcg by mouth daily    Historical Provider, MD   ValACYclovir HCl (VALTREX PO) Take 1 tablet by mouth daily as needed. Historical Provider, MD   Albuterol Sulfate (PROAIR HFA IN) Inhale 2 puffs into the lungs daily as needed. Historical Provider, MD     PHYSICAL:   Vitals:    02/01/23 0836   BP: 120/74   Pulse: 69   Resp: 14   Temp: (!) 96.5 °F (35.8 °C)   SpO2: 94%     PLANNED PROCEDURE   See procedure note  SEDATION  Planned agent: Versed and Fentanyl  ASA Classification: 2  Class 1: A normal healthy patient  Class 2: Pt with mild to moderate systemic disease  Class 3: Severe systemic disease or disturbance  Class 4: Severe systemic disorders that are already life threatening. Class 5: Moribund pt with little chances of survival, for more than 24 hours. Mallampati I Airway Classification: 2    1. Pre-procedure diagnostic studies complete and results available. 2. Previous sedation/anesthesia experiences assessed.   3. The patient is an appropriate candidate to undergo the planned procedure sedation and anesthesia. (Refer to nursing sedation/analgesia documentation record)  4. Formulation and discussion of sedation/procedure plan, risks, and expectations with patient and/or responsible adult completed. 5. Patient examined immediately prior to the procedure.  (Refer to nursing sedation/analgesia documentation record)    Garret Loera DO  Electronically signed 2/1/2023 at 9:01 AM

## 2023-02-01 NOTE — PROCEDURES
Pre-operative Diagnosis: Lumbar facet pain     Post-operative Diagnosis: Lumbar facet pain     Procedure: BILATERAL lumbar thermal radiofrequency ablation targeting facet joints L4/L5 and L5/S1    Procedure Description:  After consent was obtained, the patient was placed in the prone position with a pillow under the abdomen to reduce the lordotic curve of the lumbar spine. The lower back was prepped with chloraprep and draped in a sterile fashion. Then, 0.5 cc of 1 % lidocaine was used for local anesthesia of the skin and superficial subcutaneous tissues. Three 20-gauge 150mm SMK cannulas with 10-mm active tips were advanced under fluoroscopic guidance in an AP view to the junction of the superior articular process and the transverse process of the L4 and L5 vertebra and at the sacral ala. There were no paresthesias, heme or CSF obtained. Needle placement was confirmed using AP and oblique views. This procedure was repeated on the contralateral side. Sensory and motor stimulation at 50Hz and 2Hz and impedance measurements were carried out having reached threshold at:     RIGHT  L4: 0.2V/3V/150-300 Ohms  L5: 0.2V/3V/150-300 Ohms  SA: 0.2V/3V/150-300 Ohms     LEFT  L4: 0.2V/3V/150-300 Ohms  L5: 0.2V/3V/150-300 Ohms  SA: 0.2V/3V/150-300 Ohms        Then, 1cc of 2% Lidocaine was injected at the site. Temperature was then raised to 80 degrees centigrade for 90 seconds with a 15 second temperature ramp. No pain was reported during the lesioning. The needles were then withdrawn without complications. The patient tolerated the procedure well. The patient was transported to the recovery room and discharged in ambulatory fashion.     Procedural Complications: None  Estimated Blood Loss: 0 mL    IV sedation was used during the procedure:  - Moderate intravenous conscious sedation was supervised by Dr. Britton Meyer  - The patient was independently monitored by a Registered Nurse assigned to the procedure room  - Monitoring included automated blood pressure, continuous EKG, and continuous pulse oximetry  - The detailed conscious record is permanently stored in the [address] [address]  - The following is the conscious sedation record:  Start Time: 08:17  End Time : 08:32  Duration: 15 minutes   Medications Administered: 3 mg Versed, 100 mcg Fentanyl        Aaron Thomas DO  Interventional Pain Management/PM&R   [place]

## 2023-02-01 NOTE — POST SEDATION
[facility]  Sedation/Analgesia Post Sedation Record    Pt Name: Julissa Ashraf  MRN: [id number]  YOB: 1975  Procedure Performed By: Arti Gaines DO  Primary Care Physician: Reece Ribeiro PA-C    POST-PROCEDURE    Physicians/Assistants: Arti Gaines DO  Procedure Performed: See Procedure Note   Sedation/Anesthesia: Versed and Fentanyl (See procedure note for amount and duration)  Estimated Blood Loss:     0  ml  Specimens Removed: None        Complications: None           Aaron Babin DO  Electronically signed 2/1/2023 at 9:01 AM

## 2023-02-01 NOTE — PROGRESS NOTES
9265: Patient to Phase II Recovery via bed in stable condition on room air. Patient denies any nausea or shortness of breath. States her pain is 5/10 at this time. 0694: Patient requesting snack at this time. 1257: IV removed. No complications. 9258: Patient getting dressed. 8817: Patient discharged.

## 2023-02-16 ENCOUNTER — OFFICE VISIT (OUTPATIENT)
Dept: PHYSICAL MEDICINE AND REHAB | Age: 48
End: 2023-02-16
Payer: COMMERCIAL

## 2023-02-16 VITALS
SYSTOLIC BLOOD PRESSURE: 128 MMHG | WEIGHT: 238 LBS | BODY MASS INDEX: 38.25 KG/M2 | DIASTOLIC BLOOD PRESSURE: 84 MMHG | HEIGHT: 66 IN

## 2023-02-16 DIAGNOSIS — M47.816 FACET HYPERTROPHY OF LUMBAR REGION: Primary | ICD-10-CM

## 2023-02-16 DIAGNOSIS — M53.3 SACROILIAC JOINT PAIN: ICD-10-CM

## 2023-02-16 DIAGNOSIS — G89.4 CHRONIC PAIN SYNDROME: ICD-10-CM

## 2023-02-16 DIAGNOSIS — M53.3 SI (SACROILIAC) JOINT DYSFUNCTION: ICD-10-CM

## 2023-02-16 DIAGNOSIS — M47.816 LUMBAR SPONDYLOSIS: ICD-10-CM

## 2023-02-16 PROCEDURE — 99214 OFFICE O/P EST MOD 30 MIN: CPT | Performed by: NURSE PRACTITIONER

## 2023-02-16 NOTE — PROGRESS NOTES
Chronic Pain/PM&R Clinic Note     Encounter Date: 2/16/23    Subjective:   Chief Complaint:   Chief Complaint   Patient presents with    Follow-up     Pt. Reports minimal improvement. History of Present Illness:   Yamini Brown is a 50 y.o. female seen in the clinic initially on 09/26/2022 upon request from Dr Voila Louis for her history of SI, low back pain. She has a personal medical history of GROVER, asthma, obesity, pre-diabetic, anxiety, depression. Patient presents with complaints of bilateral low back pain, right is the worst, does go into the buttocks and occasionally does have some thigh pain. She denies pain going into the legs or feet. She describes the pain as a constant dull ache. She reports pain for 18 + months. She fell on the ice in 2021, no fractures that she knew of, and no significant back pain at that time. Then she went to [place], walked a lot, and then noticed the back pain. She has tried chiropractor, massage, physical therapy with relief, OTCs, topicals with mild relief. Pain is increased walking, standing, up.down stairs, change in position, sitting to long. Pain is alleviated by heat, naproxen. Mornings are the worst, she feels stiff but pain does continue throughout the day. She tries to do exercises at home, but minimally. 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 Denies any numbness, tingling, loss of bowel or bladder. She reports [facility] did give her a intramuscular injection with no relief. Pain scale : at worst pain is 9/10, at best pain is 2/10. Today, 11/02/2022, patient presents for planned follow up. She completed bilateral lumbar medical branch blocks at L4-5, L5-S1 with Dr Onur Guerra on 10/19/2022. Procedure went well, she states she had relief for about 5 days. She reports she was able to walk for longer distance, do home exercises with pain at a more tolerable levels.  She would like to continue with there series. She denies any new numbness, tingling, loss of bowel or bladder. She is also asking if we received images and notes from [address] [place] [address] regarding her right hip pain, she was told she could possibly get injections with us in the future there. Today, 1/17/2023, patient presents for planned procedure follow-up. She completed confirmatory bilateral lumbar medial branch blocks targeting facet joints of L4-5, L5-S1 with Dr. Oliva Munoz on 1/12/2023. Procedure was a little more uncomfortable but she felt that her pain was resolved for about 4-5 days, she feels she had about 95% relief at that time. She was able to tolerate doing housework, walking, activity with pain much more controlled. She states she then noticed pain started to come back, and was having difficulty even loading her . She is interested in completing radiofrequency ablations at same levels. She is asking about doing them bilaterally as she does not have much time off from work. She does note right-side is worse than left. She denies any new numbness, tingling, loss of bowel bladder control    Today, 2/16/2023, patient presents for planned procedural follow-up. She completed bilateral lumbar radiofrequency ablations targeting L4-5, L5-S1 with Dr. Oliva Munoz on 2/1/2023. Procedure went very well, no concerns. She reports she is getting some relief, but unsure how much as her hips have been more bothersome to her now. She reports that they are tender to lay on and has to change positions. She reports taking her Naproxen once to twice daily with good relief. She denies any new numbness, tingling or loss of bowel and bladder.      History of Interventions:   Surgery: No previous lumbar surgeries  Injections: b/l MBB L4-5, L5-S1 (10/19/22), (1/12/2023)- relief x 5 days   B/L RFA L4-5,L5-S1 (2/1/2023) - relief     Current Treatment Medications:   Naproxen- relief    Historical Treatment Medications:   Tylenol- with relief  Advil- with relief    Imaging:    Ordering Provider: Cee Crawford          Radiology Department  Patient:  Siri Conrad [place]. :  1975   [place] [facility] [place]  [address] [place] [address]   Unit #:   [id number]       Acct #:  [de-identified]       Ordering Phys: Lior LUKE Jose Smoker PA-C            Exam Date: 22     Accession #:  [id number]     Exam:  MRI   MRI Lumbar Spine w/o Contrast     Result: See Report            EXAM:  MR LUMBAR SPINE WITHOUT INTRAVENOUS CONTRAST          CLINICAL INDICATION:  LOW BACK PAIN X1 YEAR -- ASSOCIATED RIGHT HIP PAIN     -- FALL INJURY 2021          TECHNIQUE:  Multiplanar and multisequence MR images of the lumbar spine     without intravenous contrast.  This report was created using [employer]     report generation technology. COMPARISON:  None. FINDINGS:          VERTEBRAE:  Unremarkable. Vertebral body heights are preserved. Normal     vertebral bodies and posterior elements. Normal alignment. No     spondylolisthesis. There is preservation of the normal lumbar lordosis. SPINAL CORD:  Unremarkable. Normal position and signal intensity of the     conus medullaris. SOFT TISSUES:  Unremarkable. DISCS/SPINAL CANAL/NEURAL FORAMINA:          L1-L2:  Unremarkable. Normal disc height and morphology. Normal spinal     canal and lateral recess. Normal neuroforamina. L2-L3:  Unremarkable. Normal disc height and morphology. Normal spinal     canal and lateral recesses. Normal neuroforamina. L3-L4:  Unremarkable. Normal disc height and morphology. Facet     arthropathy causes mild impression on the thecal sac but no significant     spinal stenosis. Normal neuroforamina. L4-L5:  Unremarkable. Normal disc height and morphology. Facet     arthropathy causes mild impression on thecal sac but no significant spinal     stenosis.   Normal neuroforamina. L5-S1:  Unremarkable. Normal disc height and morphology. Facet     arthropathy causes mild impression on the thecal sac but no significant     spinal stenosis. Normal neuroforamina. IMPRESSION:     Facet arthropathy at L3-4, L4-5 and L5-S1 minor impression on the thecal     sac. Otherwise normal MRI lumbar spine. Electronically Signed:     Jack Briggs MD     2022/06/20 at 14:38 EDT     Reading Location ID and State: [place] / [place] [facility]     Tel [phone], Service support  [phone], Fax [phone]                   Past Medical History:   Diagnosis Date    Asthma     Diabetes mellitus ([facility] [address]     Heterozygous MTHFR mutation [id number]     has half gene from mother     Hyperlipidemia     Polycystic ovarian disease     Sleep apnea     Thyroid disease        Past Surgical History:   Procedure Laterality Date    BREAST SURGERY Left 2/26/15    Dr. Nader Villalta Bilateral 10/19/2022    medial branch blocks bilateral Lumbar4-5, 5-Sacral 1 performed by Owens Fleischer, DO at [facility] Bilateral 1/12/2023    medial branch blocks targeting the facet joints at bilateral Lumbar 4-5,5-Sacral 1 performed by Owens Fleischer, DO at [facility] Bilateral 2/1/2023    bilateral Lumbar 4-5, 5-Sacral 1 radiofrequency ablation performed by Owens Fleischer, DO at [address] [place] [address]  over 5 years ago ?     US BREAST BIOPSY W LOC DEVICE 1ST LESION LEFT Left Feb 10/2015    [facility]    US BREAST BIOPSY W LOC DEVICE 1ST LESION RIGHT Right 2020       Family History   Problem Relation Age of Onset    High Blood Pressure Father     High Cholesterol Father     Cancer Paternal Uncle 76        pancreatic    Diabetes Maternal Grandmother     Cancer Paternal Grandfather [de-identified]        pancreatic    Cancer Maternal Aunt 78 pancreatic    Breast Cancer Paternal Cousin         >50    Cancer Paternal Uncle 76        pancreatic    Breast Cancer Paternal Cousin         >50    Asthma Neg Hx     Kidney Disease Neg Hx          Medications & Allergies:   Current Outpatient Medications   Medication Instructions    Albuterol Sulfate (PROAIR HFA IN) 2 puffs, Inhalation, DAILY PRN    Cholecalciferol (RA VITAMIN D-3 PO) Oral    Cyanocobalamin (B-12 PO) Oral    diphenhydrAMINE (BENADRYL) 25 mg, Oral, EVERY 6 HOURS PRN    ferrous sulfate (IRON 325) 325 mg, DAILY WITH BREAKFAST    FLUoxetine (PROZAC) 40 mg, Oral, DAILY,      fluticasone-salmeterol (ADVAIR) 250-50 MCG/DOSE AEPB 1 puff, Inhalation, DAILY PRN    folic acid (FOLVITE) 638 mcg, Oral, DAILY,      levothyroxine (SYNTHROID) 150 mcg, Oral, DAILY    metFORMIN (GLUCOPHAGE) 1,000 mg, Oral, 2 TIMES DAILY WITH MEALS    metoprolol tartrate (LOPRESSOR) 50 mg, Oral, 2 TIMES DAILY    Multiple Vitamins-Minerals (WOMENS ONE DAILY PO) 1 tablet, DAILY    naproxen (NAPROSYN) 500 mg, Oral, 2 TIMES DAILY WITH MEALS    norgestimate-ethinyl estradiol (ORTHO-CYCLEN) 0.25-35 MG-MCG per tablet 1 tablet, DAILY    Omega-3 Fatty Acids (FISH OIL) 1200 MG CAPS 1 tablet, Oral, DAILY    omeprazole (PRILOSEC) 20 mg, Oral, DAILY    Rosuvastatin Calcium (CRESTOR PO) 5 mg, Oral    ValACYclovir HCl (VALTREX PO) 1 tablet, Oral, DAILY PRN       Allergies   Allergen Reactions    Molds & Smuts      Through allergy testing    Penicillins Other (See Comments)     Unsure of reaction-?  Really allergic       Review of Systems:   Constitutional: negative for weight changes or fevers  Genitourinary: negative for bowel/bladder incontinence   Musculoskeletal: positive for low back pain, SI pain, right hip pain   Neurological: negative for any leg weakness or numbness/tingling  Behavioral/Psych: negative for anxiety/depression   All other systems reviewed and are negative    Objective:     Vitals:    02/16/23 0741   BP: 128/84 Constitutional: Pleasant, no acute distress   Head: Normocephalic, atraumatic   Eyes: Conjunctivae normal   Neck: Supple, symmetrical   Lungs: Normal respiratory effort, non-labored breathing   Cardiovascular: Limbs warm and well perfused   Abdomen: Non-protruded   Musculoskeletal: Muscle bulk symmetric, no atrophy, no gross deformities   · Lumbar Spine: ROM WNL. Lumbar paraspinals non-tender to palpation bilaterally. SLR neg bilaterally. ROSEY pos bilaterally. GAENSLEN pos bilaterally. Positive facet loading bilaterally. Neurological: Cranial nerves II-XII grossly intact. · Gait - Normal, non-antalgic gait. Ambulates without assistive device. · Motor: 5/5 muscle strength in bilateral hip flexion, knee flexion, knee extension, ankle dorsiflexion, and ankle plantar flexion   · Sensory: LT sensation intact in lower limbs   · Reflexes: 2+ symmetrical in bilateral achilles, 2+ bilateral patellar  Skin: No rashes or lesions present   Psychological: Cooperative, no exaggerated pain behaviors       Assessment:    Diagnosis Orders   1. Facet hypertrophy of lumbar region        2. Lumbar spondylosis        3. Sacroiliac joint pain        4. SI (sacroiliac) joint dysfunction        5. Chronic pain syndrome            Su Montejo is a 50 y. o.female presenting to the pain clinic for evaluation of low back pain, SI pain. She has noted axial lumbar facet mediated pain on exam and with voiced complaints. Discussed procedures to assist with pain control, Lumbar facet series targeting bilateral facet levels of L4-5,L5-S1. She would like to proceed with this series, with plans for radiofrequency ablation in the future. She can continue Naproxen, OTC Tylneol, ice, heat, topicals, stretches and educated on home exercises. I will see her back after procedures. With significant benefit from bilateral diagnostic medial branch blocks targeting L4-5, L5-S1, I have set her up for confirmatory blocks at the same levels.   We will also get images from [facility] completed this summer on her right hip. I will see her back after injections    With significant benefit from confirmatory medial branch blocks targeting bilateral L4-5, L5-S1, I have set her up for radiofrequency ablation targeting the same levels. I have also sent in a refill for her naproxen. I will see her back after procedures    Discussed that RFA can take up to one month for nerves to heal and obtain full effect, she voiced understanding. Also discussed possibly trial alternate procedures such as SI joint injections or GTB injections in the future. Continue Naproxen, OTC Tylenol ,topicals, ice, heat and exercises. I will see her back in 4-6 weeks to re-evaluate. Plan: The following treatment recommendations and plan were discussed in detail with Jeff Morales. Imaging:   I have reviewed patients imaging of lumbar XR, Lumbar MRI and results were discussed with patient today. Analgesics:    Patient is taking Acetaminophen. Patient informed that the maximum amount of acetaminophen taken on a regular basis should only be 4000 mg per day. Adjuvants:   Patient is to continue naproxen. Patient is advised to take as prescribed and not take on an empty stomach. Refill sent to pharmacy . Call for refills     Interventions:   None    Anticoagulation/NPO Recommendations:   Patient does need to hold any medications prior to the procedure- Naproxen, NSAIDs x 4 days  Patient does not need to be NPO prior to the procedure. IV Anesthesia     Multidisciplinary Pain Management:   In the presence of complex, chronic, and multi-factorial pain, the importance of a multidisciplinary approach to pain management in the patients management regimen was emphasized and discussed in great detail. OBESITY: Patient is advised to seek nutrition consult to help in managing their weight to decrease its impact on pain.    The patient was also advised to continue physical therapy and stretching exercises at home and cognitive behavioral and/or group therapy. Referrals:  None    Prescriptions Written This Visit:   None    Follow-up:   4-6 weeks    It was my pleasure to evaluate Mali Weber today. I spent over 35 minutes evaluating this patient, reviewing previous notes and images and completing documentation.        CLEMENTE Tavares - CNP   Interventional Pain Management/PM&R   [place]

## 2023-03-13 ENCOUNTER — NURSE ONLY (OUTPATIENT)
Dept: LAB | Age: 48
End: 2023-03-13

## 2023-03-16 ENCOUNTER — HOSPITAL ENCOUNTER (OUTPATIENT)
Dept: MAMMOGRAPHY | Age: 48
Discharge: HOME | End: 2023-03-16
Payer: COMMERCIAL

## 2023-03-16 DIAGNOSIS — Z12.31 VISIT FOR SCREENING MAMMOGRAM: ICD-10-CM

## 2023-03-16 PROCEDURE — 77067 SCR MAMMO BI INCL CAD: CPT

## 2023-03-21 LAB — CYTOLOGY THIN PREP PAP: NORMAL

## 2023-03-30 ENCOUNTER — OFFICE VISIT (OUTPATIENT)
Dept: PHYSICAL MEDICINE AND REHAB | Age: 48
End: 2023-03-30
Payer: COMMERCIAL

## 2023-03-30 VITALS
WEIGHT: 238 LBS | HEIGHT: 66 IN | SYSTOLIC BLOOD PRESSURE: 128 MMHG | DIASTOLIC BLOOD PRESSURE: 72 MMHG | BODY MASS INDEX: 38.25 KG/M2 | HEART RATE: 70 BPM

## 2023-03-30 DIAGNOSIS — M53.3 SACROILIAC JOINT PAIN: ICD-10-CM

## 2023-03-30 DIAGNOSIS — M53.3 SI (SACROILIAC) JOINT DYSFUNCTION: ICD-10-CM

## 2023-03-30 DIAGNOSIS — G89.4 CHRONIC PAIN SYNDROME: ICD-10-CM

## 2023-03-30 DIAGNOSIS — M47.816 FACET HYPERTROPHY OF LUMBAR REGION: Primary | ICD-10-CM

## 2023-03-30 DIAGNOSIS — M47.816 LUMBAR SPONDYLOSIS: ICD-10-CM

## 2023-03-30 PROCEDURE — 99214 OFFICE O/P EST MOD 30 MIN: CPT | Performed by: NURSE PRACTITIONER

## 2023-03-30 RX ORDER — KETOROLAC TROMETHAMINE 30 MG/ML
30 INJECTION, SOLUTION INTRAMUSCULAR; INTRAVENOUS ONCE
Status: SHIPPED | OUTPATIENT
Start: 2023-03-30 | End: 2023-04-04

## 2023-03-30 NOTE — PROGRESS NOTES
reports taking her Naproxen once to twice daily with good relief. She denies any new numbness, tingling or loss of bowel and bladder. Today, 3/30/2023, patient presents for planned follow-up. She reports that her pain is much better, but still having some ache in the area above the site of the burn. She reports it is a constant dull ache, that is worse with standing, walking, bending. She states she uses naproxen with some relief, uses once a day occasionally twice a day. She also continues to endorse bilateral hip pain that radiates to the side of her thighs not quite her knee. She also states she does have bilateral knee pain that has been occurring since she was a small child that is aching that comes and goes. She would like to address the upper low back area first.  She denies any new loss of bowel or bladder    History of Interventions:   Surgery: No previous lumbar surgeries  Injections: b/l MBB L4-5, L5-S1 (10/19/22), (2023)- relief x 5 days   B/L RFA L4-5,L5-S1 (2023) - relief     Current Treatment Medications:   Naproxen- relief    Historical Treatment Medications:   Tylenol- with relief  Advil- with relief    Imaging:    Ordering Provider: Cee Crawford          Radiology Department  Patient:  Brown Conrad [facility]. :  1975   Sex: Kris, Tawnya [facility] [place]  Location:  [address] [place] [address]   Unit #:   [id number]       Acct #:  [de-identified]       Ordering Phys: Nedra Ames PA-C            Exam Date: 22     Accession #:  [id number]     Exam:  MRI   MRI Lumbar Spine w/o Contrast     Result: See Report            EXAM:  MR LUMBAR SPINE WITHOUT INTRAVENOUS CONTRAST          CLINICAL INDICATION:  LOW BACK PAIN X1 YEAR -- ASSOCIATED RIGHT HIP PAIN     -- FALL INJURY 2021          TECHNIQUE:  Multiplanar and multisequence MR images of the lumbar spine     without intravenous contrast.  This report was created using [employer]

## 2023-04-18 NOTE — H&P
Today, patient presents for planned medial branch blocks targeting the facet joints at bilateral L2-3, L3-4    This note is reflective of the patient's previous visit for evaluation. We will proceed with today's planned procedure. Since patient's last visit for evaluation, there have been no interval changes in medical history. Patient has no new numbness, weakness, or focal neurological deficit since evaluation. Patient has no contraindications to injection (no anticoagulation or recent antibiotic intake for active infections), and has a  present or is able to drive themselves (as discussed and cleared by physician). Allergies to latex, contrast dye, and steroid medications have been confirmed with the patient prior to the procedure. NPO necessity has been assessed and accepted based on procedure complexity. The risks and benefits of the procedure have been explained including but are not limited to infection, bleeding, paralysis, immediate post procedure weakness, and dizziness; the patient acknowledges understanding and desires to proceed with the procedure. Patient has signed consent for same procedure as discussed in previous clinic encounter. All other questions and concerns were addressed at bedside. See procedure note for full details. Post procedure Instructions: The patient was advised not to drive during the day of the procedure and not to engage in any significant decision making (unless otherwise states by physician). The patient was also advised to be cautious with walking/activity for 24 hours following today's visit and asked not to engage in over-exertion (unless otherwise states by physician). After this time, it is ok to resume pre-procedure level of activity. Patient advised to apply ice to site of injection in situations of pain and discomfort. Patient advised to not submerge site of injection during bath or pool activities for approximately 24 hours post-procedure.  Patient

## 2023-04-19 ENCOUNTER — APPOINTMENT (OUTPATIENT)
Dept: GENERAL RADIOLOGY | Age: 48
End: 2023-04-19
Attending: ANESTHESIOLOGY
Payer: COMMERCIAL

## 2023-04-19 ENCOUNTER — HOSPITAL ENCOUNTER (OUTPATIENT)
Age: 48
Setting detail: OUTPATIENT SURGERY
Discharge: HOME | End: 2023-04-19
Attending: ANESTHESIOLOGY | Admitting: ANESTHESIOLOGY
Payer: COMMERCIAL

## 2023-04-19 VITALS
HEART RATE: 81 BPM | BODY MASS INDEX: 38.63 KG/M2 | HEIGHT: 66 IN | RESPIRATION RATE: 14 BRPM | SYSTOLIC BLOOD PRESSURE: 146 MMHG | WEIGHT: 240.4 LBS | OXYGEN SATURATION: 97 % | TEMPERATURE: 96.6 F | DIASTOLIC BLOOD PRESSURE: 85 MMHG

## 2023-04-19 LAB — PREGNANCY, URINE: NEGATIVE

## 2023-04-19 PROCEDURE — 3209999900 FLUORO FOR SURGICAL PROCEDURES

## 2023-04-19 PROCEDURE — 3600000056 HC PAIN LEVEL 4 BASE: Performed by: ANESTHESIOLOGY

## 2023-04-19 PROCEDURE — 2709999900 HC NON-CHARGEABLE SUPPLY: Performed by: ANESTHESIOLOGY

## 2023-04-19 PROCEDURE — 81025 URINE PREGNANCY TEST: CPT

## 2023-04-19 PROCEDURE — 2500000003 HC RX 250 WO HCPCS: Performed by: ANESTHESIOLOGY

## 2023-04-19 PROCEDURE — 7100000010 HC PHASE II RECOVERY - FIRST 15 MIN: Performed by: ANESTHESIOLOGY

## 2023-04-19 RX ORDER — BUPIVACAINE HYDROCHLORIDE 2.5 MG/ML
INJECTION, SOLUTION EPIDURAL; INFILTRATION; INTRACAUDAL PRN
Status: DISCONTINUED | OUTPATIENT
Start: 2023-04-19 | End: 2023-04-19 | Stop reason: ALTCHOICE

## 2023-04-19 RX ORDER — LIDOCAINE HYDROCHLORIDE 10 MG/ML
INJECTION, SOLUTION EPIDURAL; INFILTRATION; INTRACAUDAL; PERINEURAL PRN
Status: DISCONTINUED | OUTPATIENT
Start: 2023-04-19 | End: 2023-04-19 | Stop reason: ALTCHOICE

## 2023-04-19 ASSESSMENT — PAIN - FUNCTIONAL ASSESSMENT: PAIN_FUNCTIONAL_ASSESSMENT: 0-10

## 2023-04-19 ASSESSMENT — PAIN SCALES - GENERAL: PAINLEVEL_OUTOF10: 4

## 2023-04-19 NOTE — PROCEDURES
Pre-operative Diagnosis: Lumbar facet pain     Post-operative Diagnosis: Lumbar facet pain     Procedure: Bilateral lumbar medial branch blocks targeting facet joints of L2/L3 and L3/L4     Procedure Description:  After consent was obtained, the patient was placed in the prone position with a pillow under the abdomen to reduce the lordotic curve of the lumbar spine. The lower back was prepped with chloraprep and draped in a sterile fashion. Then, 0.5 cc of 1 % lidocaine was used for local anesthesia of the skin and superficial subcutaneous tissues. Three 22-gauge 3.5-inch spinal needles were advanced under fluoroscopy in an AP view: at the junction of the right superior articular process and the transverse process of the L2, L3, and L4 vertebrae. There was no paresthesias, heme, or CSF obtained. Needle placement was confirmed using AP and oblique views. Then, 0.5 cc of 0.5% bupivacaine was injected at each site without complications. The procedure was repeated on the contralateral side. The patient tolerated the procedure well, transported to the recovery room, and discharged in ambulatory fashion.      Procedural Complications: None  Estimated Blood Loss: 0 mL         Aaron Thomas DO  Interventional Pain Management/PM&R   [facility] and [facility]

## 2023-04-19 NOTE — PROGRESS NOTES
1045 Patient to phase 2 from surgery. Report from [facility]. Patient alert, oriented, appropriate. States pain is at her pain goal. All questions answered regarding AVS.   1055 Patient walked to discharge doors in stable condition.  Denies any questions regarding AVS.

## 2023-04-19 NOTE — PROGRESS NOTES
Discharge instructions provided to patient, voiced understanding at this time.  Call light given to patient

## 2023-05-03 ENCOUNTER — OFFICE VISIT (OUTPATIENT)
Dept: PHYSICAL MEDICINE AND REHAB | Age: 48
End: 2023-05-03
Payer: COMMERCIAL

## 2023-05-03 VITALS
WEIGHT: 240 LBS | BODY MASS INDEX: 38.57 KG/M2 | DIASTOLIC BLOOD PRESSURE: 76 MMHG | SYSTOLIC BLOOD PRESSURE: 136 MMHG | HEIGHT: 66 IN

## 2023-05-03 DIAGNOSIS — M53.3 SI (SACROILIAC) JOINT DYSFUNCTION: Primary | ICD-10-CM

## 2023-05-03 DIAGNOSIS — M47.816 LUMBAR SPONDYLOSIS: ICD-10-CM

## 2023-05-03 DIAGNOSIS — M53.3 SACROILIAC JOINT PAIN: ICD-10-CM

## 2023-05-03 DIAGNOSIS — M47.816 FACET HYPERTROPHY OF LUMBAR REGION: ICD-10-CM

## 2023-05-03 DIAGNOSIS — G89.4 CHRONIC PAIN SYNDROME: ICD-10-CM

## 2023-05-03 PROCEDURE — 99214 OFFICE O/P EST MOD 30 MIN: CPT | Performed by: NURSE PRACTITIONER

## 2023-05-03 NOTE — PROGRESS NOTES
Chronic Pain/PM&R Clinic Note     Encounter Date: 5/3/23    Subjective:   Chief Complaint:   Chief Complaint   Patient presents with    Back Pain     Radiates into hips especially right     History of Present Illness:   Treva Leal is a 50 y.o. female seen in the clinic initially on 09/26/2022 upon request from Dr Lo Moody for her history of SI, low back pain. She has a personal medical history of GROVER, asthma, obesity, pre-diabetic, anxiety, depression. Patient presents with complaints of bilateral low back pain, right is the worst, does go into the buttocks and occasionally does have some thigh pain. She denies pain going into the legs or feet. She describes the pain as a constant dull ache. She reports pain for 18 + months. She fell on the ice in 2021, no fractures that she knew of, and no significant back pain at that time. Then she went to [facility], walked a lot, and then noticed the back pain. She has tried chiropractor, massage, physical therapy with relief, OTCs, topicals with mild relief. Pain is increased walking, standing, up.down stairs, change in position, sitting to long. Pain is alleviated by heat, naproxen. Mornings are the worst, she feels stiff but pain does continue throughout the day. She tries to do exercises at home, but minimally. 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 Denies any numbness, tingling, loss of bowel or bladder. She reports [facility] did give her a intramuscular injection with no relief. Pain scale : at worst pain is 9/10, at best pain is 2/10. Today, 2/16/2023, patient presents for planned procedural follow-up. She completed bilateral lumbar radiofrequency ablations targeting L4-5, L5-S1 with Dr. Yasmin Johnson on 2/1/2023. Procedure went very well, no concerns. She reports she is getting some relief, but unsure how much as her hips have been more bothersome to her now.  She reports that they are tender to lay on and has

## 2023-05-11 ENCOUNTER — PREP FOR PROCEDURE (OUTPATIENT)
Dept: PHYSICAL MEDICINE AND REHAB | Age: 48
End: 2023-05-11

## 2023-05-22 NOTE — DISCHARGE INSTRUCTIONS
Post procedure Instructions:    No driving or making significant decisions for the remainder of the day. Be cautions with walking and activity for 24 hours, do not over exert yourself. Ok to resume pre-procedure activity level today. Apply ice to site of injection site if you have pain or discomfort. Do not submerge sit of injection during bath or pool activities for 48 hours post-procedure. Resume blood thinning medications in 24 hours. Call office [phone] if you have:  Temperature greater than 100.4  Persistent nausea and vomiting  Severe uncontrolled pain  Redness, tenderness, or signs of infection (pain, swelling, redness, odor or green/yellow discharge around the site)  Difficulty breathing, headache or visual disturbances  Hives  Persistent dizziness or light-headedness  Extreme fatigue  Any other questions or concerns you may have after discharge    In an emergency, call 911 or go to an Emergency Department at a nearby hospital    Surgical Site Infections      How can we work together to prevent Surgical Site Infections? We would like to thank you for choosing [facility] for your Surgical Care. Below you will find helpful information on how we can work together to prevent Surgical Site Infections. What is a Surgical Site Infection (SSI)? A surgical site infection is an infection that occurs after surgery in the part of the body where the surgery took place. Most patients who have surgery do not develop an infection. However, infections develop in about 1 to 3 out of every 100 patients who have surgery. Some of the common symptoms of a surgical site infection are:  Redness and pain around the area where you had surgery  Drainage of cloudy fluid from your surgical wound  Fever    Can SSIs be treated? Yes. Most surgical site infections can be treated with antibiotics. The antibiotic given to you depends on the bacteria (germs) causing the infection.  Sometimes patients with

## 2023-05-22 NOTE — H&P
Today, patient presents for planned bilateral sacroiliac joint injection. This note is reflective of the patient's previous visit for evaluation. We will proceed with today's planned procedure. Since patient's last visit for evaluation, there have been no interval changes in medical history. Patient has no new numbness, weakness, or focal neurological deficit since evaluation. Patient has no contraindications to injection (no anticoagulation or recent antibiotic intake for active infections), and has a  present or is able to drive themselves (as discussed and cleared by physician). Allergies to latex, contrast dye, and steroid medications have been confirmed with the patient prior to the procedure. NPO necessity has been assessed and accepted based on procedure complexity. The risks and benefits of the procedure have been explained including but are not limited to infection, bleeding, paralysis, immediate post procedure weakness, and dizziness; the patient acknowledges understanding and desires to proceed with the procedure. Patient has signed consent for same procedure as discussed in previous clinic encounter. All other questions and concerns were addressed at bedside. See procedure note for full details. Post procedure Instructions: The patient was advised not to drive during the day of the procedure and not to engage in any significant decision making (unless otherwise states by physician). The patient was also advised to be cautious with walking/activity for 24 hours following today's visit and asked not to engage in over-exertion (unless otherwise states by physician). After this time, it is ok to resume pre-procedure level of activity. Patient advised to apply ice to site of injection in situations of pain and discomfort. Patient advised to not submerge site of injection during bath or pool activities for approximately 24 hours post-procedure.  Patient attested to understanding post procedure

## 2023-05-23 ENCOUNTER — APPOINTMENT (OUTPATIENT)
Dept: GENERAL RADIOLOGY | Age: 48
End: 2023-05-23
Attending: ANESTHESIOLOGY
Payer: COMMERCIAL

## 2023-05-23 ENCOUNTER — HOSPITAL ENCOUNTER (OUTPATIENT)
Age: 48
Setting detail: OUTPATIENT SURGERY
Discharge: HOME | End: 2023-05-23
Attending: ANESTHESIOLOGY | Admitting: ANESTHESIOLOGY
Payer: COMMERCIAL

## 2023-05-23 VITALS
BODY MASS INDEX: 38.25 KG/M2 | DIASTOLIC BLOOD PRESSURE: 71 MMHG | RESPIRATION RATE: 16 BRPM | HEIGHT: 66 IN | WEIGHT: 238 LBS | SYSTOLIC BLOOD PRESSURE: 131 MMHG | HEART RATE: 63 BPM | TEMPERATURE: 96.5 F | OXYGEN SATURATION: 98 %

## 2023-05-23 LAB — GLUCOSE BLD STRIP.AUTO-MCNC: 97 MG/DL (ref 70–108)

## 2023-05-23 PROCEDURE — 2500000003 HC RX 250 WO HCPCS: Performed by: ANESTHESIOLOGY

## 2023-05-23 PROCEDURE — 3600000054 HC PAIN LEVEL 3 BASE: Performed by: ANESTHESIOLOGY

## 2023-05-23 PROCEDURE — 6360000002 HC RX W HCPCS: Performed by: ANESTHESIOLOGY

## 2023-05-23 PROCEDURE — 2709999900 HC NON-CHARGEABLE SUPPLY: Performed by: ANESTHESIOLOGY

## 2023-05-23 PROCEDURE — 6360000004 HC RX CONTRAST MEDICATION: Performed by: ANESTHESIOLOGY

## 2023-05-23 PROCEDURE — 27096 INJECT SACROILIAC JOINT: CPT | Performed by: ANESTHESIOLOGY

## 2023-05-23 PROCEDURE — 99152 MOD SED SAME PHYS/QHP 5/>YRS: CPT | Performed by: ANESTHESIOLOGY

## 2023-05-23 PROCEDURE — 7100000010 HC PHASE II RECOVERY - FIRST 15 MIN: Performed by: ANESTHESIOLOGY

## 2023-05-23 PROCEDURE — 3209999900 FLUORO FOR SURGICAL PROCEDURES

## 2023-05-23 PROCEDURE — 7100000011 HC PHASE II RECOVERY - ADDTL 15 MIN: Performed by: ANESTHESIOLOGY

## 2023-05-23 PROCEDURE — 82948 REAGENT STRIP/BLOOD GLUCOSE: CPT

## 2023-05-23 RX ORDER — FENTANYL CITRATE 50 UG/ML
INJECTION, SOLUTION INTRAMUSCULAR; INTRAVENOUS PRN
Status: DISCONTINUED | OUTPATIENT
Start: 2023-05-23 | End: 2023-05-23 | Stop reason: ALTCHOICE

## 2023-05-23 RX ORDER — LIDOCAINE HYDROCHLORIDE 10 MG/ML
INJECTION, SOLUTION EPIDURAL; INFILTRATION; INTRACAUDAL; PERINEURAL PRN
Status: DISCONTINUED | OUTPATIENT
Start: 2023-05-23 | End: 2023-05-23 | Stop reason: ALTCHOICE

## 2023-05-23 RX ORDER — METHYLPREDNISOLONE ACETATE 80 MG/ML
INJECTION, SUSPENSION INTRA-ARTICULAR; INTRALESIONAL; INTRAMUSCULAR; SOFT TISSUE PRN
Status: DISCONTINUED | OUTPATIENT
Start: 2023-05-23 | End: 2023-05-23 | Stop reason: ALTCHOICE

## 2023-05-23 RX ORDER — MIDAZOLAM HYDROCHLORIDE 1 MG/ML
INJECTION INTRAMUSCULAR; INTRAVENOUS PRN
Status: DISCONTINUED | OUTPATIENT
Start: 2023-05-23 | End: 2023-05-23 | Stop reason: ALTCHOICE

## 2023-05-23 RX ORDER — BUPIVACAINE HYDROCHLORIDE 2.5 MG/ML
INJECTION, SOLUTION EPIDURAL; INFILTRATION; INTRACAUDAL PRN
Status: DISCONTINUED | OUTPATIENT
Start: 2023-05-23 | End: 2023-05-23 | Stop reason: ALTCHOICE

## 2023-05-23 ASSESSMENT — PAIN - FUNCTIONAL ASSESSMENT: PAIN_FUNCTIONAL_ASSESSMENT: 0-10

## 2023-05-23 ASSESSMENT — PAIN SCALES - GENERAL: PAINLEVEL_OUTOF10: 2

## 2023-05-23 ASSESSMENT — PAIN DESCRIPTION - DESCRIPTORS: DESCRIPTORS: ACHING

## 2023-05-23 NOTE — PRE SEDATION
MD   Rosuvastatin Calcium (CRESTOR PO) Take 5 mg by mouth    Historical Provider, MD   levothyroxine (SYNTHROID) 125 MCG tablet Take 150 mcg by mouth Daily    Historical Provider, MD   fluticasone-salmeterol (ADVAIR) 250-50 MCG/DOSE AEPB Inhale 1 puff into the lungs daily as needed    Historical Provider, MD   Multiple Vitamins-Minerals (WOMENS ONE DAILY PO) Take 1 tablet by mouth daily. Historical Provider, MD   norgestimate-ethinyl estradiol (ORTHO-CYCLEN) 0.25-35 MG-MCG per tablet Take 1 tablet by mouth daily    Historical Provider, MD   metformin (GLUCOPHAGE) 500 MG tablet Take 2 tablets by mouth 2 times daily (with meals)    Historical Provider, MD   ferrous sulfate 325 (65 FE) MG tablet Take 1 tablet by mouth daily (with breakfast)    Historical Provider, MD   FLUoxetine (PROZAC) 20 MG capsule Take 2 capsules by mouth daily    Historical Provider, MD   Omega-3 Fatty Acids (FISH OIL) 1200 MG CAPS Take 1 tablet by mouth daily. Historical Provider, MD   folic acid (FOLVITE) 575 MCG tablet Take 2 tablets by mouth daily    Historical Provider, MD   ValACYclovir HCl (VALTREX PO) Take 1 tablet by mouth daily as needed. Historical Provider, MD   Albuterol Sulfate (PROAIR HFA IN) Inhale 2 puffs into the lungs daily as needed. Historical Provider, MD     PHYSICAL:   Vitals:    05/23/23 1317   BP: 131/71   Pulse: 63   Resp: 16   Temp: (!) 96.5 °F (35.8 °C)   SpO2: 98%     PLANNED PROCEDURE   See procedure note  SEDATION  Planned agent: Versed and Fentanyl  ASA Classification: 2  Class 1: A normal healthy patient  Class 2: Pt with mild to moderate systemic disease  Class 3: Severe systemic disease or disturbance  Class 4: Severe systemic disorders that are already life threatening. Class 5: Moribund pt with little chances of survival, for more than 24 hours. Mallampati I Airway Classification: 2    1. Pre-procedure diagnostic studies complete and results available.   2. Previous sedation/anesthesia

## 2023-05-23 NOTE — PROGRESS NOTES
1317: Patient to Phase II Recovery via bed in stable condition on room air. Patient denies any nausea or shortness of breath. States her pain is 2/10 and tolerable. 1319: Patient requesting snack at this time . 1325: IV removed. No complications. 1331: Patient getting dressed. 1340: Patient discharged. 4

## 2023-05-23 NOTE — POST SEDATION
[address] [address]  Sedation/Analgesia Post Sedation Record    Pt Name: Ayan Andre  MRN: [id number]  YOB: 1975  Procedure Performed By: Clair Bermudez DO  Primary Care Physician: Bart Elena PA-C    POST-PROCEDURE    Physicians/Assistants: Calir Bermudez DO  Procedure Performed: See Procedure Note   Sedation/Anesthesia: Versed and Fentanyl (See procedure note for amount and duration)  Estimated Blood Loss:     0  ml  Specimens Removed: None        Complications: None           Derik Gayla Paget, DO  Electronically signed 5/23/2023 at 3:20 PM

## 2023-05-23 NOTE — PROCEDURES
Pre-operative Diagnosis:  SI joint pain     Post-operative Diagnosis:  SI joint pain     Procedure: Bilateral SI joint injection     Procedure Description:  After having signed the informed consent, the patient was placed in the prone position. The patient's back was prepped with chloraprep solution, and draped in a sterile fashion. A total of 2 ml of 1% lidocaine were used to anesthetize the skin and underlying tissues. Under fluoroscopic guidance a single 22-gauge, 3.5 inch spinal needle was advanced to lie within the inferior pole of the RIGHT sacroiliac joint. There were no paresthesias or heme aspiration. Needle placement was confirmed in the AP view. After negative aspiration, 0.5 ml of Omnipaque 300 contrast was injected with appropriate spread observed. A total of 2 ml of 0.5% bupivicaine mixed with 40 mg depo-medrol were injected into the sacroiliac joint. The needle was withdrawn without any complications. This exact procedure was repeated on the contralateral side. The patient tolerated the procedure well, was transported to the recovery room and observed for 15 minutes and discharged in an ambulatory fashion. No immediate reported complications.     Procedural Complications: None  Estimated Blood Loss: 0 mL      IV sedation was used during the procedure:  - Moderate intravenous conscious sedation was supervised by Dr. Ken Meeks  - The patient was independently monitored by a Registered Nurse assigned to the procedure room  - Monitoring included automated blood pressure, continuous EKG, and continuous pulse oximetry  - The detailed conscious record is permanently stored in the [place] [address]  - The following is the conscious sedation record:  Start Time: 13:11  End Time : 13:26  Duration: 15 minutes   Medications Administered: 1 mg Versed, 50 mcg Fentanyl      Aaron Thomas DO  Interventional Pain Management/PM&R   [place]

## 2023-06-21 ENCOUNTER — OFFICE VISIT (OUTPATIENT)
Dept: PHYSICAL MEDICINE AND REHAB | Age: 48
End: 2023-06-21
Payer: COMMERCIAL

## 2023-06-21 VITALS
WEIGHT: 238 LBS | BODY MASS INDEX: 38.25 KG/M2 | SYSTOLIC BLOOD PRESSURE: 140 MMHG | HEIGHT: 66 IN | DIASTOLIC BLOOD PRESSURE: 84 MMHG

## 2023-06-21 DIAGNOSIS — M47.816 LUMBAR SPONDYLOSIS: ICD-10-CM

## 2023-06-21 DIAGNOSIS — M53.3 SACROILIAC JOINT PAIN: Primary | ICD-10-CM

## 2023-06-21 DIAGNOSIS — M47.816 FACET HYPERTROPHY OF LUMBAR REGION: ICD-10-CM

## 2023-06-21 DIAGNOSIS — G89.4 CHRONIC PAIN SYNDROME: ICD-10-CM

## 2023-06-21 DIAGNOSIS — M53.3 SI (SACROILIAC) JOINT DYSFUNCTION: ICD-10-CM

## 2023-06-21 PROCEDURE — 99214 OFFICE O/P EST MOD 30 MIN: CPT | Performed by: NURSE PRACTITIONER

## 2023-06-21 RX ORDER — CHLORAL HYDRATE 500 MG
CAPSULE ORAL
COMMUNITY
Start: 2023-03-13

## 2023-06-21 RX ORDER — NORETHINDRONE 0.35 MG/1
TABLET ORAL
COMMUNITY
Start: 2023-06-17

## 2023-06-21 RX ORDER — FLUTICASONE PROPIONATE 50 MCG
SPRAY, SUSPENSION (ML) NASAL
COMMUNITY
Start: 2023-06-18

## 2023-06-21 RX ORDER — PREDNISONE 20 MG/1
TABLET ORAL
COMMUNITY
Start: 2023-06-18

## 2023-06-21 RX ORDER — ACETAMINOPHEN AND CODEINE PHOSPHATE 120; 12 MG/5ML; MG/5ML
SOLUTION ORAL
COMMUNITY
Start: 2023-03-13

## 2023-06-21 NOTE — PROGRESS NOTES
home and cognitive behavioral and/or group therapy. Referrals:  None    Prescriptions Written This Visit:   None    Follow-up:   10 weeks    It was my pleasure to evaluate Fausto Nelson today. I spent over 35 minutes evaluating this patient, reviewing previous notes and images and completing documentation.        CLEMENTE Amin - CNP   Interventional Pain Management/PM&R   [place]

## 2023-08-30 ENCOUNTER — OFFICE VISIT (OUTPATIENT)
Dept: PHYSICAL MEDICINE AND REHAB | Age: 48
End: 2023-08-30
Payer: COMMERCIAL

## 2023-08-30 VITALS
DIASTOLIC BLOOD PRESSURE: 78 MMHG | HEIGHT: 66 IN | BODY MASS INDEX: 38.25 KG/M2 | WEIGHT: 238 LBS | SYSTOLIC BLOOD PRESSURE: 118 MMHG

## 2023-08-30 DIAGNOSIS — M53.3 SACROILIAC JOINT PAIN: ICD-10-CM

## 2023-08-30 DIAGNOSIS — M47.816 LUMBAR SPONDYLOSIS: ICD-10-CM

## 2023-08-30 DIAGNOSIS — M53.3 SI (SACROILIAC) JOINT DYSFUNCTION: ICD-10-CM

## 2023-08-30 DIAGNOSIS — G89.4 CHRONIC PAIN SYNDROME: ICD-10-CM

## 2023-08-30 DIAGNOSIS — M47.816 FACET HYPERTROPHY OF LUMBAR REGION: Primary | ICD-10-CM

## 2023-08-30 PROCEDURE — 99214 OFFICE O/P EST MOD 30 MIN: CPT | Performed by: NURSE PRACTITIONER

## 2023-08-30 NOTE — PROGRESS NOTES
2Functionality Assessment/Goals Worksheet     On a scale of 0 (Does not Interfere) to 10 (Completely Interferes)     1. Which number describes how during the past week pain has interfered with           the following:  A. General Activity:  2  B. Mood: 2  C. Walking Ability:  2  D. Normal Work (Includes both work outside the home and housework):  1  E. Relations with Other People:   1  F. Sleep:   1  G. Enjoyment of Life:   1    2. Patient Prefers to Take their Pain Medications:     []  On a regular basis   [x]  Only when necessary    []  Does not take pain medications    3. What are the Patient's Goals/Expectations for Visiting Pain Management?      [x]  Learn about my pain    [x]  Receive Medication   []  Physical Therapy     []  Treat Depression   []  Receive Injections    []  Treat Sleep   []  Deal with Anxiety and Stress   []  Treat Opoid Dependence/Addiction   []  Other:

## 2023-08-30 NOTE — PROGRESS NOTES
Chronic Pain/PM&R Clinic Note     Encounter Date: 8/30/23    Subjective:   Chief Complaint:   Chief Complaint   Patient presents with    Follow-up     History of Present Illness:   Shira Hurt is a 50 y.o. female seen in the clinic initially on 09/26/2022 upon request from Dr Caprice Rocha for her history of SI, low back pain. She has a personal medical history of GROVER, asthma, obesity, pre-diabetic, anxiety, depression. Patient presents with complaints of bilateral low back pain, right is the worst, does go into the buttocks and occasionally does have some thigh pain. She denies pain going into the legs or feet. She describes the pain as a constant dull ache. She reports pain for 18 + months. She fell on the ice in 2021, no fractures that she knew of, and no significant back pain at that time. Then she went to [facility], walked a lot, and then noticed the back pain. She has tried chiropractor, massage, physical therapy with relief, OTCs, topicals with mild relief. Pain is increased walking, standing, up.down stairs, change in position, sitting to long. Pain is alleviated by heat, naproxen. Mornings are the worst, she feels stiff but pain does continue throughout the day. She tries to do exercises at home, but minimally. 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 Denies any numbness, tingling, loss of bowel or bladder. She reports [facility] did give her a intramuscular injection with no relief. Pain scale : at worst pain is 9/10, at best pain is 2/10. Today, 5/3/2023, patient presents for planned procedural follow up. She completed bilateral lumbar medical branch blocks targeting L2-3, L3-4 with Dr Chica English on 4/19/2023. She reports that she had significant relief for about 2 weeks, and then pain came back to what it was prior to baseline. She states she had about 95% relief for about 2 weeks. She notices that pain is better in general with continued relief

## 2023-10-12 ENCOUNTER — OFFICE VISIT (OUTPATIENT)
Dept: BARIATRICS/WEIGHT MGMT | Age: 48
End: 2023-10-12
Payer: COMMERCIAL

## 2023-10-12 VITALS
BODY MASS INDEX: 39.18 KG/M2 | WEIGHT: 243.8 LBS | DIASTOLIC BLOOD PRESSURE: 84 MMHG | HEIGHT: 66 IN | SYSTOLIC BLOOD PRESSURE: 128 MMHG | HEART RATE: 72 BPM | TEMPERATURE: 96.4 F

## 2023-10-12 DIAGNOSIS — F32.A DEPRESSION, UNSPECIFIED DEPRESSION TYPE: ICD-10-CM

## 2023-10-12 DIAGNOSIS — E03.9 HYPOTHYROIDISM, UNSPECIFIED TYPE: ICD-10-CM

## 2023-10-12 DIAGNOSIS — I10 ESSENTIAL HYPERTENSION: ICD-10-CM

## 2023-10-12 DIAGNOSIS — G47.33 OSA ON CPAP: ICD-10-CM

## 2023-10-12 DIAGNOSIS — E28.2 PCOS (POLYCYSTIC OVARIAN SYNDROME): ICD-10-CM

## 2023-10-12 DIAGNOSIS — K21.9 GASTROESOPHAGEAL REFLUX DISEASE, UNSPECIFIED WHETHER ESOPHAGITIS PRESENT: ICD-10-CM

## 2023-10-12 DIAGNOSIS — E78.5 HYPERLIPIDEMIA, UNSPECIFIED HYPERLIPIDEMIA TYPE: ICD-10-CM

## 2023-10-12 DIAGNOSIS — G89.29 CHRONIC BILATERAL LOW BACK PAIN, UNSPECIFIED WHETHER SCIATICA PRESENT: ICD-10-CM

## 2023-10-12 DIAGNOSIS — M54.50 CHRONIC BILATERAL LOW BACK PAIN, UNSPECIFIED WHETHER SCIATICA PRESENT: ICD-10-CM

## 2023-10-12 DIAGNOSIS — J45.909 UNCOMPLICATED ASTHMA, UNSPECIFIED ASTHMA SEVERITY, UNSPECIFIED WHETHER PERSISTENT: ICD-10-CM

## 2023-10-12 PROCEDURE — 3074F SYST BP LT 130 MM HG: CPT | Performed by: PHYSICIAN ASSISTANT

## 2023-10-12 PROCEDURE — 99204 OFFICE O/P NEW MOD 45 MIN: CPT | Performed by: PHYSICIAN ASSISTANT

## 2023-10-12 PROCEDURE — 3079F DIAST BP 80-89 MM HG: CPT | Performed by: PHYSICIAN ASSISTANT

## 2023-10-12 NOTE — PROGRESS NOTES
Related Comorbid Conditions:   Significant weight related diseases affecting this patient are Hyperlipidemia, Hypothyroidism, GERD, Depression, Sleep apnea, PCOS, Chronic LBP and HTN    Allergies: Allergies   Allergen Reactions    Molds & Smuts      Through allergy testing    Penicillins Other (See Comments)     Unsure of reaction-? Really allergic       Past Medical History:     Past Medical History:   Diagnosis Date    Asthma     Diabetes mellitus ([address])     Heterozygous MTHFR mutation [id number]     has half gene from mother     Hyperlipidemia     Infertility, female     Polycystic ovarian disease     Sleep apnea     Thyroid disease    . Past Surgical History:  Past Surgical History:   Procedure Laterality Date    BACK INJECTION Bilateral 5/23/2023    bilateral sacroiliac joint injection performed by Rudy Tubis, DO at [address] Left 2/26/15    Dr. Guero Agarwal Bilateral 10/19/2022    medial branch blocks bilateral Lumbar4-5, 5-Sacral 1 performed by Runtastickd Tubis, DO at [address] Bilateral 1/12/2023    medial branch blocks targeting the facet joints at bilateral Lumbar 4-5,5-Sacral 1 performed by [employer], DO at [address] Bilateral 2/1/2023    bilateral Lumbar 4-5, 5-Sacral 1 radiofrequency ablation performed by [employer], DO at [address] Bilateral 4/19/2023    medial branch blocks targeting the facet joints at bilateral Lumbar 2-3, 3-4 performed by [employer], DO at [address]  over 5 years ago ?     US BREAST BIOPSY W LOC DEVICE 1ST LESION LEFT [facility] Feb 10/2015    [facility]    US BREAST BIOPSY W LOC DEVICE 1ST LESION RIGHT Right 2020       Family History:  Family History   Problem Relation Age of Onset    Arthritis Mother

## 2023-10-16 RX ORDER — NAPROXEN 500 MG/1
500 TABLET ORAL 2 TIMES DAILY WITH MEALS
Qty: 60 TABLET | Refills: 2 | Status: SHIPPED | OUTPATIENT
Start: 2023-10-16 | End: 2023-10-25

## 2023-10-16 NOTE — TELEPHONE ENCOUNTER
OARRS reviewed. UDS: negative. Last seen: 8/30/2023.  Follow-up:   Future Appointments   Date Time Provider [address]   10/25/2023  7:40 AM Josephine [facility], APRN - CNP [facility] [place]   12/15/2023  8:00 AM Alfonso Nielson [address] [facility]

## 2023-10-25 ENCOUNTER — OFFICE VISIT (OUTPATIENT)
Dept: PHYSICAL MEDICINE AND REHAB | Age: 48
End: 2023-10-25
Payer: COMMERCIAL

## 2023-10-25 ENCOUNTER — TELEPHONE (OUTPATIENT)
Dept: PHYSICAL MEDICINE AND REHAB | Age: 48
End: 2023-10-25

## 2023-10-25 VITALS
DIASTOLIC BLOOD PRESSURE: 72 MMHG | SYSTOLIC BLOOD PRESSURE: 136 MMHG | BODY MASS INDEX: 39.05 KG/M2 | WEIGHT: 243 LBS | HEIGHT: 66 IN

## 2023-10-25 DIAGNOSIS — M47.816 FACET HYPERTROPHY OF LUMBAR REGION: ICD-10-CM

## 2023-10-25 DIAGNOSIS — G89.4 CHRONIC PAIN SYNDROME: ICD-10-CM

## 2023-10-25 DIAGNOSIS — M47.816 LUMBAR SPONDYLOSIS: ICD-10-CM

## 2023-10-25 DIAGNOSIS — M53.3 SI (SACROILIAC) JOINT DYSFUNCTION: Primary | ICD-10-CM

## 2023-10-25 DIAGNOSIS — M53.3 SACROILIAC JOINT PAIN: ICD-10-CM

## 2023-10-25 PROCEDURE — 99214 OFFICE O/P EST MOD 30 MIN: CPT | Performed by: NURSE PRACTITIONER

## 2023-10-25 PROCEDURE — 96372 THER/PROPH/DIAG INJ SC/IM: CPT | Performed by: NURSE PRACTITIONER

## 2023-10-25 RX ORDER — CELECOXIB 100 MG/1
100 CAPSULE ORAL 2 TIMES DAILY
Qty: 60 CAPSULE | Refills: 0 | Status: SHIPPED | OUTPATIENT
Start: 2023-10-25 | End: 2023-11-24

## 2023-10-25 RX ORDER — KETOROLAC TROMETHAMINE 30 MG/ML
30 INJECTION, SOLUTION INTRAMUSCULAR; INTRAVENOUS ONCE
Status: COMPLETED | OUTPATIENT
Start: 2023-10-25 | End: 2023-10-25

## 2023-10-25 RX ADMIN — KETOROLAC TROMETHAMINE 30 MG: 30 INJECTION, SOLUTION INTRAMUSCULAR; INTRAVENOUS at 16:30

## 2023-10-25 NOTE — TELEPHONE ENCOUNTER
Pt. Called and LVM stating she saw you this morning and about 8am was given a toradol shot-30mg. Says it dd nothing for her pain and she feels she cannot work with the pain she is having. Wanting to know what else she is to do till she gets her SI jt. Injection done 11/15.

## 2023-10-25 NOTE — TELEPHONE ENCOUNTER
Ill send in celebrex 100 mg bid. She can start this tomorrow am, STOP naproxen. She can continue tylenol, ice, heat, topicals. And injection will provide the most relief for her. .    Script sent to [employer]

## 2023-10-26 NOTE — TELEPHONE ENCOUNTER
Called pt. And reviewed Elsi response. She states she got the celebrex and has had 2 dose and it is not helping either. Advised to give more time and that no further med adjustments will be done. Requested to move up her  injection procedure and transferred to scheduling for her to discuss with them.

## 2023-11-15 ENCOUNTER — HOSPITAL ENCOUNTER (OUTPATIENT)
Age: 48
Setting detail: OUTPATIENT SURGERY
Discharge: HOME | End: 2023-11-15
Attending: ANESTHESIOLOGY | Admitting: ANESTHESIOLOGY
Payer: COMMERCIAL

## 2023-11-15 ENCOUNTER — APPOINTMENT (OUTPATIENT)
Dept: GENERAL RADIOLOGY | Age: 48
End: 2023-11-15
Attending: ANESTHESIOLOGY
Payer: COMMERCIAL

## 2023-11-15 VITALS
RESPIRATION RATE: 14 BRPM | HEIGHT: 66 IN | TEMPERATURE: 97 F | OXYGEN SATURATION: 95 % | BODY MASS INDEX: 39.25 KG/M2 | SYSTOLIC BLOOD PRESSURE: 129 MMHG | WEIGHT: 244.2 LBS | HEART RATE: 68 BPM | DIASTOLIC BLOOD PRESSURE: 78 MMHG

## 2023-11-15 LAB
GLUCOSE BLD STRIP.AUTO-MCNC: 103 MG/DL (ref 70–108)
PREGNANCY, URINE: NEGATIVE

## 2023-11-15 PROCEDURE — 82948 REAGENT STRIP/BLOOD GLUCOSE: CPT

## 2023-11-15 PROCEDURE — 99152 MOD SED SAME PHYS/QHP 5/>YRS: CPT | Performed by: ANESTHESIOLOGY

## 2023-11-15 PROCEDURE — 7100000011 HC PHASE II RECOVERY - ADDTL 15 MIN: Performed by: ANESTHESIOLOGY

## 2023-11-15 PROCEDURE — 6360000002 HC RX W HCPCS: Performed by: ANESTHESIOLOGY

## 2023-11-15 PROCEDURE — 2500000003 HC RX 250 WO HCPCS: Performed by: ANESTHESIOLOGY

## 2023-11-15 PROCEDURE — 2709999900 HC NON-CHARGEABLE SUPPLY: Performed by: ANESTHESIOLOGY

## 2023-11-15 PROCEDURE — 6360000004 HC RX CONTRAST MEDICATION: Performed by: ANESTHESIOLOGY

## 2023-11-15 PROCEDURE — 81025 URINE PREGNANCY TEST: CPT

## 2023-11-15 PROCEDURE — 7100000010 HC PHASE II RECOVERY - FIRST 15 MIN: Performed by: ANESTHESIOLOGY

## 2023-11-15 PROCEDURE — 3600000054 HC PAIN LEVEL 3 BASE: Performed by: ANESTHESIOLOGY

## 2023-11-15 PROCEDURE — 27096 INJECT SACROILIAC JOINT: CPT | Performed by: ANESTHESIOLOGY

## 2023-11-15 RX ORDER — MIDAZOLAM HYDROCHLORIDE 1 MG/ML
INJECTION INTRAMUSCULAR; INTRAVENOUS PRN
Status: DISCONTINUED | OUTPATIENT
Start: 2023-11-15 | End: 2023-11-15 | Stop reason: ALTCHOICE

## 2023-11-15 RX ORDER — BUPIVACAINE HYDROCHLORIDE 5 MG/ML
INJECTION, SOLUTION EPIDURAL; INTRACAUDAL PRN
Status: DISCONTINUED | OUTPATIENT
Start: 2023-11-15 | End: 2023-11-15 | Stop reason: ALTCHOICE

## 2023-11-15 RX ORDER — METHYLPREDNISOLONE ACETATE 80 MG/ML
INJECTION, SUSPENSION INTRA-ARTICULAR; INTRALESIONAL; INTRAMUSCULAR; SOFT TISSUE PRN
Status: DISCONTINUED | OUTPATIENT
Start: 2023-11-15 | End: 2023-11-15 | Stop reason: ALTCHOICE

## 2023-11-15 RX ORDER — FENTANYL CITRATE 50 UG/ML
INJECTION, SOLUTION INTRAMUSCULAR; INTRAVENOUS PRN
Status: DISCONTINUED | OUTPATIENT
Start: 2023-11-15 | End: 2023-11-15 | Stop reason: ALTCHOICE

## 2023-11-15 RX ORDER — LIDOCAINE HYDROCHLORIDE 10 MG/ML
INJECTION, SOLUTION EPIDURAL; INFILTRATION; INTRACAUDAL; PERINEURAL PRN
Status: DISCONTINUED | OUTPATIENT
Start: 2023-11-15 | End: 2023-11-15 | Stop reason: ALTCHOICE

## 2023-11-15 ASSESSMENT — PAIN - FUNCTIONAL ASSESSMENT: PAIN_FUNCTIONAL_ASSESSMENT: 0-10

## 2023-11-15 ASSESSMENT — PAIN SCALES - GENERAL: PAINLEVEL_OUTOF10: 0

## 2023-11-15 NOTE — POST SEDATION
[facility]  Sedation/Analgesia Post Sedation Record    Pt Name: Phoenix Gaines  MRN: [id number]  YOB: 1975  Procedure Performed By: Cristofer Thacker DO  Primary Care Physician: Holley Andrade PA-C    POST-PROCEDURE    Physicians/Assistants: Cristofer Thacker DO  Procedure Performed: See Procedure Note   Sedation/Anesthesia: Versed and Fentanyl (See procedure note for amount and duration)  Estimated Blood Loss:     0  ml  Specimens Removed: None        Complications: None           Aaron Goncalves DO  Electronically signed 11/15/2023 at 8:25 AM

## 2023-11-15 NOTE — PROCEDURES
Pre-operative Diagnosis:  SI joint pain     Post-operative Diagnosis:  SI joint pain     Procedure: Bilateral SI joint injection     Procedure Description:  After having signed the informed consent, the patient was placed in the prone position. The patient's back was prepped with chloraprep solution, and draped in a sterile fashion. A total of 2 ml of 1% lidocaine were used to anesthetize the skin and underlying tissues. Under fluoroscopic guidance a single 22-gauge, 3.5 inch spinal needle was advanced to lie within the inferior pole of the RIGHT sacroiliac joint. There were no paresthesias or heme aspiration. Needle placement was confirmed in the AP view. After negative aspiration, 0.5 ml of Omnipaque 300 contrast was injected with appropriate spread observed. A total of 4 ml of 0.5% bupivicaine mixed with 40 mg depo-medrol were injected into the sacroiliac joint. The needle was withdrawn without any complications. This exact procedure was repeated on the contralateral side. The patient tolerated the procedure well, was transported to the recovery room and observed for 15 minutes and discharged in an ambulatory fashion. No immediate reported complications.     Procedural Complications: None  Estimated Blood Loss: 0 mL    IV sedation was used during the procedure:  - Moderate intravenous conscious sedation was supervised by Dr. Jorje Ferguson  - The patient was independently monitored by a Registered Nurse assigned to the procedure room  - Monitoring included automated blood pressure, continuous EKG, and continuous pulse oximetry  - The detailed conscious record is permanently stored in the [address]  - The following is the conscious sedation record:  Start Time: 07:56  End Time : 08:11  Duration: 15 minutes   Medications Administered: 2 mg Versed, 50 mcg Fentanyl      Aaron Thomas DO  Interventional Pain Management/PM&R   [address]

## 2023-11-15 NOTE — PRE SEDATION
[facility]  Pre-Sedation/Analgesia History & Physical    Pt Name: Adali Pulido  MRN: [id number]  YOB: 1975  Provider Performing Procedure: David Munguia DO   Primary Care Physician: Hortencia Tejeda PA-C      MEDICAL HISTORY       has a past medical history of Asthma, Diabetes mellitus ([address]), Heterozygous MTHFR mutation Q1946C, Hyperlipidemia, Infertility, female, Polycystic ovarian disease, Sleep apnea, and Thyroid disease. SURGICAL HISTORY   has a past surgical history that includes Bunionectomy (Right, 2011); Upper gastrointestinal endoscopy (over 5 years ago ?); US BREAST BIOPSY W LOC DEVICE 1ST LESION LEFT (Left, Feb 10/2015); US BREAST BIOPSY W LOC DEVICE 1ST LESION RIGHT (Right, 2020); Breast surgery (Left, 2/26/15); Pain management procedure (Bilateral, 10/19/2022); Pain management procedure (Bilateral, 1/12/2023); Pain management procedure (Bilateral, 2/1/2023); Pain management procedure (Bilateral, 4/19/2023); and Back Injection (Bilateral, 5/23/2023). ALLERGIES   Allergies as of 10/25/2023 - Fully Reviewed 10/25/2023   Allergen Reaction Noted    Molds & smuts  12/10/2012    Penicillins Other (See Comments) 10/24/2013       MEDICATIONS   Prior to Admission medications    Medication Sig Start Date End Date Taking?  Authorizing Provider   celecoxib (CELEBREX) 100 MG capsule Take 1 capsule by mouth 2 times daily 10/25/23 11/24/23  Mark Burton Clarity, APRN - CNP   ROSA 0.35 MG tablet  6/17/23   ProviderRandy MD   fluticasone Wesly Bradley) 50 MCG/ACT nasal spray  6/18/23   ProviderRandy MD   BL CALCIUM-MAGNESIUM-ZINC PO Take by mouth 3/14/23   Randy Chiu MD   diphenhydrAMINE (BENADRYL) 25 MG tablet Take 1 tablet by mouth every 6 hours as needed for Itching    Randy Chiu MD   metoprolol tartrate (LOPRESSOR) 50 MG tablet Take 1 tablet by mouth 2 times daily    Randy Chiu MD   Cyanocobalamin (B-12 PO) Take by mouth    Provider

## 2023-11-15 NOTE — PROGRESS NOTES
0800-  Patient arrived to phase II via cart. Spontaneous respiraitons even and unlabored. Placed on monitor--VSS. Report received from [address]    2031-  Assessment completed. Patient is alert and oriented x4. IV capped off-- no complications. Patient denies pain--will monitor. Injection sites clean and dry. 5-  Snack and drink given to patient. Family in car. 4811-  All belongings in room. 0592-  IV removed-- no complications. Bandage applied. 1756-  Patient dressed. 0644-  Patient discharged in stable condition with all belongings. This RN walked patient to car.

## 2023-12-13 ENCOUNTER — OFFICE VISIT (OUTPATIENT)
Dept: PHYSICAL MEDICINE AND REHAB | Age: 48
End: 2023-12-13
Payer: COMMERCIAL

## 2023-12-13 VITALS
HEIGHT: 66 IN | WEIGHT: 244 LBS | BODY MASS INDEX: 39.21 KG/M2 | DIASTOLIC BLOOD PRESSURE: 74 MMHG | SYSTOLIC BLOOD PRESSURE: 122 MMHG

## 2023-12-13 DIAGNOSIS — G89.4 CHRONIC PAIN SYNDROME: ICD-10-CM

## 2023-12-13 DIAGNOSIS — M79.18 MYOFASCIAL PAIN: ICD-10-CM

## 2023-12-13 DIAGNOSIS — M53.3 SI (SACROILIAC) JOINT DYSFUNCTION: Primary | ICD-10-CM

## 2023-12-13 DIAGNOSIS — M47.816 LUMBAR SPONDYLOSIS: ICD-10-CM

## 2023-12-13 DIAGNOSIS — M53.3 SACROILIAC JOINT PAIN: ICD-10-CM

## 2023-12-13 DIAGNOSIS — M47.816 FACET HYPERTROPHY OF LUMBAR REGION: ICD-10-CM

## 2023-12-13 PROCEDURE — 99214 OFFICE O/P EST MOD 30 MIN: CPT | Performed by: NURSE PRACTITIONER

## 2023-12-14 NOTE — PROGRESS NOTES
[facility]      Trudy Blackman         [phone]  12/15/2023   Chief Complaint   Patient presents with    Follow-up     1yr GROVER f/u w/[employer] download. Doing well. Needs script for PAP supplies. Pt of Elsa Figueredo PA-C    PAP Download:   Original or initial AHI: 12.0     Date of initial study: 10/29/2013      Compliant  100%     Noncompliant 0 %     PAP Type CPAP   Level  13 cmH2O   Avg Hrs/Day 8hrs 0mins  AHI: 1.8   Recorded compliance dates , 11/13/23  to 12/12/23   Machine/Mfg:   [x] ResMed    [] Respironics/Dreamstation   Interface:   [] Nasal    [] Nasal pillows   [x] FFM      Provider:      [] SR-HME     [x][employer]     [] [employer]    [] Arliss Grave    [] [place]               [] [facility]      [] [employer]    [] [address] [facility] [address]:      [] Other    Neck Size: 17 inches  Mallampati 4  ESS:  9  SAQLI: 81    Here is a scan of the most recent download:            Presentation:   Kermit Arita presents for sleep medicine follow up for obstructive sleep apnea  Since the last visit, Kermit Arita is doing well with PAP. She is having trouble sleeping and she feels it is related to RLS. She is more tired since not sleeping. Equipment issues: The pressure is  acceptable, the mask is acceptable     Sleep issues:  Do you feel better? Yes and No  More rested? No   Better concentration? yes    Progress History:   Since last visit any new medical issues? No  New ER or hospital visits? No  Any new or changes in medicines? No  Any new sleep medicines? No    Review of Systems -   Review of Systems   Constitutional:  Negative for activity change, appetite change, chills and fever. HENT:  Negative for congestion and postnasal drip. Eyes: Negative. Respiratory:  Negative for cough, chest tightness, shortness of breath, wheezing and stridor. Cardiovascular:  Negative for chest pain and leg swelling. Gastrointestinal:  Negative for diarrhea and nausea. Endocrine: Negative.

## 2023-12-15 ENCOUNTER — OFFICE VISIT (OUTPATIENT)
Dept: PULMONOLOGY | Age: 48
End: 2023-12-15
Payer: COMMERCIAL

## 2023-12-15 VITALS
SYSTOLIC BLOOD PRESSURE: 116 MMHG | OXYGEN SATURATION: 97 % | TEMPERATURE: 97.7 F | HEIGHT: 66 IN | DIASTOLIC BLOOD PRESSURE: 68 MMHG | WEIGHT: 244.2 LBS | HEART RATE: 73 BPM | BODY MASS INDEX: 39.25 KG/M2

## 2023-12-15 DIAGNOSIS — E66.9 OBESITY (BMI 30-39.9): ICD-10-CM

## 2023-12-15 DIAGNOSIS — G47.33 OBSTRUCTIVE SLEEP APNEA ON CPAP: Primary | ICD-10-CM

## 2023-12-15 PROCEDURE — 99214 OFFICE O/P EST MOD 30 MIN: CPT | Performed by: PHYSICIAN ASSISTANT

## 2023-12-15 RX ORDER — GABAPENTIN 300 MG/1
300 CAPSULE ORAL NIGHTLY
Qty: 30 CAPSULE | Refills: 3 | Status: SHIPPED | OUTPATIENT
Start: 2023-12-15 | End: 2024-04-13

## 2023-12-15 RX ORDER — GABAPENTIN 300 MG/1
300 CAPSULE ORAL NIGHTLY
Qty: 30 CAPSULE | Refills: 5 | Status: SHIPPED | OUTPATIENT
Start: 2023-12-15 | End: 2023-12-15

## 2023-12-15 RX ORDER — GABAPENTIN 300 MG/1
300 CAPSULE ORAL NIGHTLY
Qty: 30 CAPSULE | Refills: 3 | Status: SHIPPED | OUTPATIENT
Start: 2023-12-15 | End: 2023-12-15

## 2024-01-08 RX ORDER — NAPROXEN 500 MG/1
500 TABLET ORAL 2 TIMES DAILY WITH MEALS
Qty: 60 TABLET | Refills: 2 | OUTPATIENT
Start: 2024-01-08

## 2024-02-13 ENCOUNTER — OFFICE VISIT (OUTPATIENT)
Dept: PHYSICAL MEDICINE AND REHAB | Age: 49
End: 2024-02-13
Payer: COMMERCIAL

## 2024-02-13 VITALS
WEIGHT: 244 LBS | HEIGHT: 66 IN | BODY MASS INDEX: 39.21 KG/M2 | DIASTOLIC BLOOD PRESSURE: 70 MMHG | SYSTOLIC BLOOD PRESSURE: 126 MMHG

## 2024-02-13 DIAGNOSIS — M47.816 LUMBAR SPONDYLOSIS: ICD-10-CM

## 2024-02-13 DIAGNOSIS — M53.3 SACROILIAC JOINT PAIN: ICD-10-CM

## 2024-02-13 DIAGNOSIS — M53.3 SI (SACROILIAC) JOINT DYSFUNCTION: Primary | ICD-10-CM

## 2024-02-13 DIAGNOSIS — G89.4 CHRONIC PAIN SYNDROME: ICD-10-CM

## 2024-02-13 DIAGNOSIS — M79.18 MYOFASCIAL PAIN: ICD-10-CM

## 2024-02-13 DIAGNOSIS — M47.816 FACET HYPERTROPHY OF LUMBAR REGION: ICD-10-CM

## 2024-02-13 PROCEDURE — 99214 OFFICE O/P EST MOD 30 MIN: CPT | Performed by: NURSE PRACTITIONER

## 2024-02-13 RX ORDER — MAGNESIUM 30 MG
30 TABLET ORAL DAILY
COMMUNITY

## 2024-02-13 NOTE — PROGRESS NOTES
Chronic Pain/PM&R Clinic Note     Encounter Date: 2/13/24    Subjective:   Chief Complaint:   Chief Complaint   Patient presents with    Follow-up     2 month follow up      History of Present Illness:   Cleo Camacho is a 48 y.o. female seen in the clinic initially on 09/26/2022 upon request from Dr Aguila for her history of SI, low back pain. She has a personal medical history of GROVER, asthma, obesity, pre-diabetic, anxiety, depression.     Patient presents with complaints of bilateral low back pain, right is the worst, does go into the buttocks and occasionally does have some thigh pain. She denies pain going into the legs or feet. She describes the pain as a constant dull ache. She reports pain for 18 + months. She fell on the ice in 2021, no fractures that she knew of, and no significant back pain at that time. Then she went to [place], walked a lot, and then noticed the back pain. She has tried chiropractor, massage, physical therapy with relief, OTCs, topicals with mild relief. Pain is increased walking, standing, up.down stairs, change in position, sitting to long. Pain is alleviated by heat, naproxen. Mornings are the worst, she feels stiff but pain does continue throughout the day. She tries to do exercises at home, but minimally. 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 Denies any numbness, tingling, loss of bowel or bladder. She reports [facility] did give her a intramuscular injection with no relief. Pain scale : at worst pain is 9/10, at best pain is 2/10.     Today, 10/25/2023, patient presents for planned follow-up. She presents with her mother to visit today. She reports that she had been doing very well up until 5 days ago. She reports that on Saturday she started having some right sided buttocks pain. She denies any falls or trauma that caused her pain. She reports that it seems to be worsening, has not been sleeping. She describes it as a

## 2024-02-13 NOTE — PROGRESS NOTES
Functionality Assessment/Goals Worksheet     On a scale of 0 (Does not Interfere) to 10 (Completely Interferes)     1.  Which number describes how during the past week pain has interfered with           the following:  A.  General Activity:  10  B.  Mood: 10  C.  Walking Ability:  0  D.  Normal Work (Includes both work outside the home and housework):  2  E.  Relations with Other People:   10  F.  Sleep:   10  G.  Enjoyment of Life:   10    2.  Patient Prefers to Take their Pain Medications:     []  On a regular basis   [x]  Only when necessary    []  Does not take pain medications    3.  What are the Patient's Goals/Expectations for Visiting Pain Management?     []  Learn about my pain    []  Receive Medication   []  Physical Therapy     []  Treat Depression   []  Receive Injections    []  Treat Sleep   []  Deal with Anxiety and Stress   []  Treat Opoid Dependence/Addiction   [x]  Other:

## 2024-02-29 NOTE — PROGRESS NOTES
[place] [facility]      Cleo Camacho         [id number]  3/1/2024   Chief Complaint   Patient presents with    Follow-up     2mo GROVER f/u w/[employer] download and med check.  Notes she feels the Gabapentin isn't high enough.        Pt of Sofia Nielson PA-C     PAP Download:   Original or initial AHI: 12.0     Date of initial study: 10/29/2013        Compliant  100%     Noncompliant 0 %     PAP Type CPAP    Level  13 cmH2O   Avg Hrs/Day 7hrs 44mins  AHI: 1.7   Recorded compliance dates , 1/29/24  to 2/27/24     Machine/Mfg:   [x] ResMed    [] Respironics/Dreamstation   Interface:   [] Nasal    [] Nasal pillows   [x] FFM      Provider:      [] [facility]     [x][facility]     [] [employer]    [] [employer]    [] [facility]               [] [facility]      [] [employer]    [] [place]:      [] Other    Neck Size: 17 inches  Mallampati 4  ESS:  10  SAQLI: 87    Here is a scan of the most recent download:            Presentation:   Cleo presents for sleep medicine follow up for obstructive sleep apnea, restless leg syndrome  Since the last visit, Cleo is doing well with PAP.  RLS still an issue.  Gabapentin helped a little. She still wakes with RLS.  She is still tired during the day.  She started Magnesium pills and spray. Ferritin level ok in 2021    Equipment issues:  The pressure is  acceptable, the mask is acceptable     Sleep issues:  Do you feel better? No  More rested?No   Better concentration? no    Progress History:   Since last visit any new medical issues? No  New ER or hospital visits? No  Any new or changes in medicines? No  Any new sleep medicines? No    Review of Systems -   Review of Systems   Constitutional:  Negative for activity change, appetite change, chills and fever.   HENT:  Negative for congestion and postnasal drip.    Eyes: Negative.    Respiratory:  Negative for cough, chest tightness, shortness of breath, wheezing and stridor.    Cardiovascular:  Negative for chest

## 2024-03-01 ENCOUNTER — OFFICE VISIT (OUTPATIENT)
Dept: PULMONOLOGY | Age: 49
End: 2024-03-01
Payer: COMMERCIAL

## 2024-03-01 VITALS
WEIGHT: 246.2 LBS | TEMPERATURE: 98.2 F | OXYGEN SATURATION: 98 % | HEIGHT: 66 IN | BODY MASS INDEX: 39.57 KG/M2 | HEART RATE: 59 BPM | DIASTOLIC BLOOD PRESSURE: 80 MMHG | SYSTOLIC BLOOD PRESSURE: 114 MMHG

## 2024-03-01 DIAGNOSIS — G47.33 OBSTRUCTIVE SLEEP APNEA ON CPAP: Primary | ICD-10-CM

## 2024-03-01 DIAGNOSIS — G25.81 RLS (RESTLESS LEGS SYNDROME): ICD-10-CM

## 2024-03-01 DIAGNOSIS — E66.9 OBESITY (BMI 30-39.9): ICD-10-CM

## 2024-03-01 PROCEDURE — 99214 OFFICE O/P EST MOD 30 MIN: CPT | Performed by: PHYSICIAN ASSISTANT

## 2024-03-01 RX ORDER — GABAPENTIN 300 MG/1
300 CAPSULE ORAL NIGHTLY
Qty: 180 CAPSULE | Refills: 1 | Status: SHIPPED | OUTPATIENT
Start: 2024-03-01 | End: 2025-03-01

## 2024-03-01 ASSESSMENT — ENCOUNTER SYMPTOMS
ALLERGIC/IMMUNOLOGIC NEGATIVE: 1
DIARRHEA: 0
SHORTNESS OF BREATH: 0
STRIDOR: 0
NAUSEA: 0
CHEST TIGHTNESS: 0
BACK PAIN: 0
WHEEZING: 0
EYES NEGATIVE: 1
COUGH: 0

## 2024-03-20 ENCOUNTER — HOSPITAL ENCOUNTER (OUTPATIENT)
Dept: MAMMOGRAPHY | Age: 49
Discharge: HOME | End: 2024-03-20
Payer: COMMERCIAL

## 2024-03-20 DIAGNOSIS — Z12.31 VISIT FOR SCREENING MAMMOGRAM: ICD-10-CM

## 2024-03-20 PROCEDURE — 77063 BREAST TOMOSYNTHESIS BI: CPT

## 2024-04-18 ENCOUNTER — TELEPHONE (OUTPATIENT)
Dept: PULMONOLOGY | Age: 49
End: 2024-04-18

## 2024-04-18 RX ORDER — GABAPENTIN 300 MG/1
CAPSULE ORAL
Qty: 180 CAPSULE | Refills: 2 | Status: SHIPPED | OUTPATIENT
Start: 2024-04-18 | End: 2024-10-15

## 2024-04-18 NOTE — TELEPHONE ENCOUNTER
Received refill request for Gabapentin (NEURONTIN) 300 MG capsule . Medication was last ordered by Sofia Nielson . Medication was last ordered on 3/1/204 with 1 refills.   Patient was last seen in the office 3/1/2024. Patient has a scheduled follow up 5/17/2024.   Medication needs to be sent to [facility].   Patient stating that the Gabapentin is really healing with the 600 mg. I advised she should have one refill but she stating she is also going on vacation and she doesn see a refill on there and wants to make sure she has enough. Please advise.

## 2024-05-16 NOTE — PROGRESS NOTES
Gastrointestinal:  Negative for diarrhea and nausea.   Endocrine: Negative.    Genitourinary: Negative.    Musculoskeletal: Negative.  Negative for arthralgias and back pain.   Skin: Negative.    Allergic/Immunologic: Negative.    Neurological: Negative.  Negative for dizziness and light-headedness.   Psychiatric/Behavioral:  Positive for sleep disturbance.    All other systems reviewed and are negative.       Physical Exam:    BMI:  Body mass index is 38.93 kg/m².    Wt Readings from Last 3 Encounters:   05/17/24 109.4 kg (241 lb 3.2 oz)   03/01/24 111.7 kg (246 lb 3.2 oz)   02/13/24 110.7 kg (244 lb)     Weight stable / unchanged  Vitals: /72 (Site: Left Upper Arm, Position: Sitting, Cuff Size: Large Adult)   Pulse 65   Temp 97.7 °F (36.5 °C) (Oral)   Ht 1.676 m (5' 6\")   Wt 109.4 kg (241 lb 3.2 oz)   SpO2 98% Comment: r/a  BMI 38.93 kg/m²       Physical Exam  Constitutional:       General: She is not in acute distress.     Appearance: Normal appearance. She is normal weight. She is not ill-appearing.   HENT:      Head: Normocephalic and atraumatic.      Nose: Nose normal.   Eyes:      Extraocular Movements: Extraocular movements intact.      Pupils: Pupils are equal, round, and reactive to light.   Pulmonary:      Effort: Pulmonary effort is normal.   Neurological:      Mental Status: She is alert and oriented to person, place, and time.   Psychiatric:         Attention and Perception: Attention and perception normal.         Mood and Affect: Mood and affect normal.         Speech: Speech normal.         Behavior: Behavior normal.         Thought Content: Thought content normal.         Cognition and Memory: Cognition and memory normal.         Judgment: Judgment normal.           ASSESSMENT/DIAGNOSIS     Diagnosis Orders   1. Obstructive sleep apnea on CPAP        2. Obesity (BMI 30-39.9)        3. RLS (restless legs syndrome)        4. Other insomnia               Plan   Do you need any equipment

## 2024-05-17 ENCOUNTER — OFFICE VISIT (OUTPATIENT)
Dept: PULMONOLOGY | Age: 49
End: 2024-05-17
Payer: COMMERCIAL

## 2024-05-17 VITALS
TEMPERATURE: 97.7 F | DIASTOLIC BLOOD PRESSURE: 72 MMHG | HEART RATE: 65 BPM | WEIGHT: 241.2 LBS | SYSTOLIC BLOOD PRESSURE: 110 MMHG | HEIGHT: 66 IN | OXYGEN SATURATION: 98 % | BODY MASS INDEX: 38.76 KG/M2

## 2024-05-17 DIAGNOSIS — G47.09 OTHER INSOMNIA: ICD-10-CM

## 2024-05-17 DIAGNOSIS — G25.81 RLS (RESTLESS LEGS SYNDROME): ICD-10-CM

## 2024-05-17 DIAGNOSIS — G47.33 OBSTRUCTIVE SLEEP APNEA ON CPAP: Primary | ICD-10-CM

## 2024-05-17 DIAGNOSIS — E66.9 OBESITY (BMI 30-39.9): ICD-10-CM

## 2024-05-17 PROCEDURE — 99214 OFFICE O/P EST MOD 30 MIN: CPT | Performed by: PHYSICIAN ASSISTANT

## 2024-05-17 RX ORDER — BUPROPION HYDROCHLORIDE 150 MG/1
150 TABLET ORAL EVERY MORNING
COMMUNITY
Start: 2024-03-19

## 2024-05-17 ASSESSMENT — ENCOUNTER SYMPTOMS
SHORTNESS OF BREATH: 0
COUGH: 0
WHEEZING: 0
ALLERGIC/IMMUNOLOGIC NEGATIVE: 1
BACK PAIN: 0
CHEST TIGHTNESS: 0
DIARRHEA: 0

## 2024-09-18 ENCOUNTER — OFFICE VISIT (OUTPATIENT)
Dept: PHYSICAL MEDICINE AND REHAB | Age: 49
End: 2024-09-18
Payer: COMMERCIAL

## 2024-09-18 VITALS
WEIGHT: 241 LBS | SYSTOLIC BLOOD PRESSURE: 128 MMHG | DIASTOLIC BLOOD PRESSURE: 78 MMHG | HEIGHT: 66 IN | BODY MASS INDEX: 38.73 KG/M2

## 2024-09-18 DIAGNOSIS — M47.816 FACET HYPERTROPHY OF LUMBAR REGION: ICD-10-CM

## 2024-09-18 DIAGNOSIS — G89.4 CHRONIC PAIN SYNDROME: ICD-10-CM

## 2024-09-18 DIAGNOSIS — M53.3 SACROILIAC JOINT PAIN: ICD-10-CM

## 2024-09-18 DIAGNOSIS — M47.816 LUMBAR SPONDYLOSIS: ICD-10-CM

## 2024-09-18 DIAGNOSIS — M53.3 SI (SACROILIAC) JOINT DYSFUNCTION: Primary | ICD-10-CM

## 2024-09-18 PROCEDURE — 99214 OFFICE O/P EST MOD 30 MIN: CPT | Performed by: NURSE PRACTITIONER

## 2024-09-23 NOTE — DISCHARGE INSTRUCTIONS
Post procedure Instructions:    No driving or making significant decisions for the remainder of the day.   Be cautions with walking and activity for 24 hours, do not over exert yourself.  Ok to resume pre-procedure activity level today.  Apply ice to site of injection site if you have pain or discomfort.  Do not submerge sit of injection during bath or pool activities for 48 hours post-procedure.  Resume blood thinning medications in 24 hours.     Call office [phone] if you have:  Temperature greater than 100.4  Persistent nausea and vomiting  Severe uncontrolled pain  Redness, tenderness, or signs of infection (pain, swelling, redness, odor or green/yellow discharge around the site)  Difficulty breathing, headache or visual disturbances  Hives  Persistent dizziness or light-headedness  Extreme fatigue  Any other questions or concerns you may have after discharge    In an emergency, call 911 or go to an Emergency Department at a nearby hospital    “Surgical Site Infections”      How can we work together to prevent Surgical Site Infections?   We would like to thank you for choosing [facility] for your Surgical Care.  Below you will find helpful information on how we can work together to prevent Surgical Site Infections.    What is a Surgical Site Infection (SSI)?  A surgical site infection is an infection that occurs after surgery in the part of the body where the surgery took place. Most patients who have surgery do not develop an infection. However, infections develop in about 1 to 3 out of every 100 patients who have surgery.  Some of the common symptoms of a surgical site infection are:  Redness and pain around the area where you had surgery  Drainage of cloudy fluid from your surgical wound  Fever    Can SSIs be treated?  Yes. Most surgical site infections can be treated with antibiotics. The antibiotic given to you depends on the bacteria (germs) causing the infection. Sometimes patients with

## 2024-10-02 ENCOUNTER — HOSPITAL ENCOUNTER (OUTPATIENT)
Age: 49
Setting detail: OUTPATIENT SURGERY
Discharge: HOME | End: 2024-10-02
Attending: ANESTHESIOLOGY | Admitting: ANESTHESIOLOGY
Payer: COMMERCIAL

## 2024-10-02 ENCOUNTER — APPOINTMENT (OUTPATIENT)
Dept: GENERAL RADIOLOGY | Age: 49
End: 2024-10-02
Attending: ANESTHESIOLOGY
Payer: COMMERCIAL

## 2024-10-02 VITALS
TEMPERATURE: 96.3 F | HEART RATE: 61 BPM | BODY MASS INDEX: 38.7 KG/M2 | RESPIRATION RATE: 16 BRPM | HEIGHT: 66 IN | SYSTOLIC BLOOD PRESSURE: 125 MMHG | OXYGEN SATURATION: 95 % | DIASTOLIC BLOOD PRESSURE: 60 MMHG | WEIGHT: 240.8 LBS

## 2024-10-02 LAB
GLUCOSE BLD STRIP.AUTO-MCNC: 107 MG/DL (ref 70–108)
PREGNANCY, URINE: NEGATIVE

## 2024-10-02 PROCEDURE — 7100000010 HC PHASE II RECOVERY - FIRST 15 MIN: Performed by: ANESTHESIOLOGY

## 2024-10-02 PROCEDURE — 2709999900 HC NON-CHARGEABLE SUPPLY: Performed by: ANESTHESIOLOGY

## 2024-10-02 PROCEDURE — 6360000002 HC RX W HCPCS: Performed by: ANESTHESIOLOGY

## 2024-10-02 PROCEDURE — 99152 MOD SED SAME PHYS/QHP 5/>YRS: CPT | Performed by: ANESTHESIOLOGY

## 2024-10-02 PROCEDURE — 27096 INJECT SACROILIAC JOINT: CPT | Performed by: ANESTHESIOLOGY

## 2024-10-02 PROCEDURE — 7100000011 HC PHASE II RECOVERY - ADDTL 15 MIN: Performed by: ANESTHESIOLOGY

## 2024-10-02 PROCEDURE — 82948 REAGENT STRIP/BLOOD GLUCOSE: CPT

## 2024-10-02 PROCEDURE — 6360000004 HC RX CONTRAST MEDICATION: Performed by: ANESTHESIOLOGY

## 2024-10-02 PROCEDURE — 81025 URINE PREGNANCY TEST: CPT

## 2024-10-02 PROCEDURE — 2500000003 HC RX 250 WO HCPCS: Performed by: ANESTHESIOLOGY

## 2024-10-02 PROCEDURE — 3600000054 HC PAIN LEVEL 3 BASE: Performed by: ANESTHESIOLOGY

## 2024-10-02 RX ORDER — BUPIVACAINE HYDROCHLORIDE 5 MG/ML
INJECTION, SOLUTION PERINEURAL PRN
Status: DISCONTINUED | OUTPATIENT
Start: 2024-10-02 | End: 2024-10-02 | Stop reason: ALTCHOICE

## 2024-10-02 RX ORDER — LIDOCAINE HYDROCHLORIDE 10 MG/ML
INJECTION, SOLUTION EPIDURAL; INFILTRATION; INTRACAUDAL; PERINEURAL PRN
Status: DISCONTINUED | OUTPATIENT
Start: 2024-10-02 | End: 2024-10-02 | Stop reason: ALTCHOICE

## 2024-10-02 RX ORDER — IOPAMIDOL 612 MG/ML
INJECTION, SOLUTION INTRAVASCULAR PRN
Status: DISCONTINUED | OUTPATIENT
Start: 2024-10-02 | End: 2024-10-02 | Stop reason: ALTCHOICE

## 2024-10-02 RX ORDER — MIDAZOLAM HYDROCHLORIDE 1 MG/ML
INJECTION INTRAMUSCULAR; INTRAVENOUS PRN
Status: DISCONTINUED | OUTPATIENT
Start: 2024-10-02 | End: 2024-10-02 | Stop reason: ALTCHOICE

## 2024-10-02 RX ORDER — FENTANYL CITRATE 50 UG/ML
INJECTION, SOLUTION INTRAMUSCULAR; INTRAVENOUS PRN
Status: DISCONTINUED | OUTPATIENT
Start: 2024-10-02 | End: 2024-10-02 | Stop reason: ALTCHOICE

## 2024-10-02 RX ORDER — METHYLPREDNISOLONE ACETATE 80 MG/ML
INJECTION, SUSPENSION INTRA-ARTICULAR; INTRALESIONAL; INTRAMUSCULAR; SOFT TISSUE PRN
Status: DISCONTINUED | OUTPATIENT
Start: 2024-10-02 | End: 2024-10-02 | Stop reason: ALTCHOICE

## 2024-10-02 ASSESSMENT — PAIN - FUNCTIONAL ASSESSMENT
PAIN_FUNCTIONAL_ASSESSMENT: 0-10
PAIN_FUNCTIONAL_ASSESSMENT: NONE - DENIES PAIN

## 2024-10-02 NOTE — PROGRESS NOTES
0914 Patient arrives to recovery room via cart.  Spontaneous respirations, vss, report received from surgical RN.  Patient denies pain, numbness, tingling , nausea.  Injection site clean, dry, intact. HOB elevated. IV capped. Snack and drink given.  Call light within reach.    0930 IV discontinued. Up to dress self  0940 Discharge to home in stable ambulatory condition with Alma Delia

## 2024-10-02 NOTE — PROCEDURES
Pre-operative Diagnosis:  SI joint pain     Post-operative Diagnosis:  SI joint pain     Procedure: Bilateral SI joint injection     Procedure Description:  After having signed the informed consent, the patient was placed in the prone position.  The patient's back was prepped with chloraprep solution, and draped in a sterile fashion. A total of 2 ml of 1% lidocaine were used to anesthetize the skin and underlying tissues.  Under fluoroscopic guidance a single 22-gauge, 3.5 inch spinal needle was advanced to lie within the inferior pole of the RIGHT sacroiliac joint.  There were no paresthesias or heme aspiration. Needle placement was confirmed in the AP view.  After negative aspiration, 0.5 ml of Omnipaque 300 contrast was injected with appropriate spread observed.  A total of 4 ml of 0.5% bupivicaine mixed with 40 mg depo-medrol were injected into the sacroiliac joint. The needle was withdrawn without any complications.  This exact procedure was repeated on the contralateral side. The patient tolerated the procedure well, was transported to the recovery room and observed for 15 minutes and discharged in an ambulatory fashion. No immediate reported complications.    Procedural Complications: None  Estimated Blood Loss: 0 mL    IV sedation was used during the procedure:  - Moderate intravenous conscious sedation was supervised by Dr. Thomas  - The patient was independently monitored by a Registered Nurse assigned to the procedure room  - Monitoring included automated blood pressure, continuous EKG, and continuous pulse oximetry  - The detailed conscious record is permanently stored in the Hospital Information System  - The following is the conscious sedation record:  Start Time: 09:08  End Time : 09:23  Duration: 15 minutes   Medications Administered: 2 mg Versed, 50 mcg Fentanyl      Aaron Thomas DO  Interventional Pain Management/PM&R   [facility] [place]

## 2024-10-02 NOTE — POST SEDATION
[facility]  Sedation/Analgesia Post Sedation Record    Pt Name: Cleo Camacho  MRN: [id number]  YOB: 1975  Procedure Performed By: Aaron Thomas DO  Primary Care Physician: Shani Hale PA-C    POST-PROCEDURE    Physicians/Assistants: Aaron Thomas DO  Procedure Performed: See Procedure Note   Sedation/Anesthesia: Versed and Fentanyl (See procedure note for amount and duration)  Estimated Blood Loss:     0  ml  Specimens Removed: None        Complications: None           Aaron Thomas DO  Electronically signed 10/2/2024 at 4:44 PM

## 2024-10-02 NOTE — PRE SEDATION
[facility]  Pre-Sedation/Analgesia History & Physical    Pt Name: Cleo Camacho  MRN: [id number]  YOB: 1975  Provider Performing Procedure: Aaron Thomas DO   Primary Care Physician: Shani Hale PA-C      MEDICAL HISTORY       has a past medical history of Asthma, Diabetes mellitus (HCC), Heterozygous MTHFR mutation [id number], Hyperlipidemia, Infertility, female, Polycystic ovarian disease, Sleep apnea, and Thyroid disease.  SURGICAL HISTORY   has a past surgical history that includes Bunionectomy (Right, 2011); Upper gastrointestinal endoscopy (over 5 years ago ?); US BREAST BIOPSY W LOC DEVICE 1ST LESION LEFT (Left, Feb 10/2015); US BREAST BIOPSY W LOC DEVICE 1ST LESION RIGHT (Right, 2020); Breast surgery (Left, 2/26/15); Pain management procedure (Bilateral, 10/19/2022); Pain management procedure (Bilateral, 1/12/2023); Pain management procedure (Bilateral, 2/1/2023); Pain management procedure (Bilateral, 4/19/2023); Back Injection (Bilateral, 5/23/2023); and Back Injection (Bilateral, 11/15/2023).    ALLERGIES   Allergies as of 09/18/2024 - Fully Reviewed 09/18/2024   Allergen Reaction Noted    Molds & smuts  12/10/2012    Penicillins Other (See Comments) 10/24/2013       MEDICATIONS   Prior to Admission medications    Medication Sig Start Date End Date Taking? Authorizing Provider   buPROPion (WELLBUTRIN XL) 150 MG extended release tablet Take 1 tablet by mouth every morning 3/19/24  Yes Randy Chiu MD   gabapentin (NEURONTIN) 300 MG capsule Take 1-2 caps at night 4/18/24 10/15/24 Yes Sofia Nielson PA-C   magnesium 30 MG tablet Take 1 tablet by mouth daily   Yes Randy Chiu MD   ROSA 0.35 MG tablet  6/17/23  Yes Randy Chiu MD   fluticasone (FLONASE) 50 MCG/ACT nasal spray As needed 6/18/23  Yes Randy Chiu MD BL CALCIUM-MAGNESIUM-ZINC PO Take by mouth 3/14/23  Yes Randy Chiu MD   metoprolol tartrate (LOPRESSOR)

## 2024-10-22 ENCOUNTER — OFFICE VISIT (OUTPATIENT)
Dept: PHYSICAL MEDICINE AND REHAB | Age: 49
End: 2024-10-22
Payer: COMMERCIAL

## 2024-10-22 VITALS
BODY MASS INDEX: 38.57 KG/M2 | WEIGHT: 240 LBS | HEIGHT: 66 IN | DIASTOLIC BLOOD PRESSURE: 84 MMHG | SYSTOLIC BLOOD PRESSURE: 126 MMHG

## 2024-10-22 DIAGNOSIS — M47.816 FACET HYPERTROPHY OF LUMBAR REGION: ICD-10-CM

## 2024-10-22 DIAGNOSIS — M79.18 MYOFASCIAL PAIN: ICD-10-CM

## 2024-10-22 DIAGNOSIS — M47.816 LUMBAR SPONDYLOSIS: Primary | ICD-10-CM

## 2024-10-22 DIAGNOSIS — M53.3 SACROILIAC JOINT PAIN: ICD-10-CM

## 2024-10-22 DIAGNOSIS — G89.4 CHRONIC PAIN SYNDROME: ICD-10-CM

## 2024-10-22 DIAGNOSIS — M53.3 SI (SACROILIAC) JOINT DYSFUNCTION: ICD-10-CM

## 2024-10-22 PROCEDURE — 99214 OFFICE O/P EST MOD 30 MIN: CPT | Performed by: NURSE PRACTITIONER

## 2024-10-22 NOTE — PROGRESS NOTES
Functionality Assessment/Goals Worksheet     On a scale of 0 (Does not Interfere) to 10 (Completely Interferes)     1.  Which number describes how during the past week pain has interfered with           the following:  A.  General Activity:  2  B.  Mood: 2  C.  Walking Ability:  5  D.  Normal Work (Includes both work outside the home and housework):  3  E.  Relations with Other People:   1  F.  Sleep:   1  G.  Enjoyment of Life:   2    2.  Patient Prefers to Take their Pain Medications:     []  On a regular basis   [x]  Only when necessary    []  Does not take pain medications    3.  What are the Patient's Goals/Expectations for Visiting Pain Management?     [x]  Learn about my pain    []  Receive Medication   []  Physical Therapy     []  Treat Depression   [x]  Receive Injections    []  Treat Sleep   []  Deal with Anxiety and Stress   []  Treat Opoid Dependence/Addiction   []  Other:

## 2024-10-22 NOTE — PROGRESS NOTES
Chronic Pain/PM&R Clinic Note     Encounter Date: 10/22/24    Subjective:   Chief Complaint:   Chief Complaint   Patient presents with    Follow Up After Procedure     Follow-up     History of Present Illness:   Cleo Camacho is a 48 y.o. female seen in the clinic initially on 09/26/2022 upon request from Dr Aguila for her history of SI, low back pain. She has a personal medical history of GROVER, asthma, obesity, pre-diabetic, anxiety, depression.     Patient presents with complaints of bilateral low back pain, right is the worst, does go into the buttocks and occasionally does have some thigh pain. She denies pain going into the legs or feet. She describes the pain as a constant dull ache. She reports pain for 18 + months. She fell on the ice in 2021, no fractures that she knew of, and no significant back pain at that time. Then she went to [place], walked a lot, and then noticed the back pain. She has tried chiropractor, massage, physical therapy with relief, OTCs, topicals with mild relief. Pain is increased walking, standing, up.down stairs, change in position, sitting to long. Pain is alleviated by heat, naproxen. Mornings are the worst, she feels stiff but pain does continue throughout the day. She tries to do exercises at home, but minimally. 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 Denies any numbness, tingling, loss of bowel or bladder. She reports [facility] did give her a intramuscular injection with no relief. Pain scale : at worst pain is 9/10, at best pain is 2/10.     Today, 2/13/2024, patient presents for planned follow-up.  She states that she feels like she is still getting significant relief from her injections that were completed in November.  She states she will have occasional flareup of pain, but otherwise feels like it is still well-controlled.  She states that she has been seeing a chiropractor once every 3 weeks which is helping maintain

## 2024-12-02 NOTE — H&P
discussed in detail with the patient. Patient wants to proceed with the injection with Dr Thomas        Procedure educations:  Discussed with patient about risks with procedure including infection, reaction to medication, increased pain, failure of procedures, worsening of symptoms, or bleeding.    Anticoagulation/NPO Recommendations:   Patient does need to hold any medications prior to the procedure- Naproxen 4 days  Patient does need to be NPO prior to the procedure.  IV ANESTHESIA     Multidisciplinary Pain Management:   In the presence of complex, chronic, and multi-factorial pain, the importance of a multidisciplinary approach to pain management in the patient’s management regimen was emphasized and discussed in great detail.   OBESITY: Patient is advised to seek nutrition consult to help in managing their weight to decrease its impact on pain.   The patient was also advised to continue physical therapy and stretching exercises at home and cognitive behavioral and/or group therapy.     Referrals:  None    Prescriptions Written This Visit:   None    Follow-up:   6 weeks after injection     It was my pleasure to evaluate Cleo Camacho today.  I spent over 35 minutes evaluating this patient, reviewing previous notes and images and completing documentation.       Aaron Thomas, DO   Interventional Pain Management/PM&R   [facility] [place]

## 2024-12-03 ENCOUNTER — HOSPITAL ENCOUNTER (OUTPATIENT)
Age: 49
Setting detail: OUTPATIENT SURGERY
Discharge: HOME | End: 2024-12-03
Attending: ANESTHESIOLOGY | Admitting: ANESTHESIOLOGY
Payer: COMMERCIAL

## 2024-12-03 ENCOUNTER — APPOINTMENT (OUTPATIENT)
Dept: GENERAL RADIOLOGY | Age: 49
End: 2024-12-03
Attending: ANESTHESIOLOGY
Payer: COMMERCIAL

## 2024-12-03 VITALS
DIASTOLIC BLOOD PRESSURE: 57 MMHG | HEART RATE: 70 BPM | TEMPERATURE: 96.8 F | RESPIRATION RATE: 16 BRPM | SYSTOLIC BLOOD PRESSURE: 120 MMHG | BODY MASS INDEX: 38.54 KG/M2 | OXYGEN SATURATION: 94 % | HEIGHT: 66 IN | WEIGHT: 239.8 LBS

## 2024-12-03 LAB
GLUCOSE BLD STRIP.AUTO-MCNC: 112 MG/DL (ref 70–108)
PREGNANCY, URINE: NEGATIVE

## 2024-12-03 PROCEDURE — 82948 REAGENT STRIP/BLOOD GLUCOSE: CPT

## 2024-12-03 PROCEDURE — 64636 DESTROY L/S FACET JNT ADDL: CPT | Performed by: ANESTHESIOLOGY

## 2024-12-03 PROCEDURE — 2709999900 HC NON-CHARGEABLE SUPPLY: Performed by: ANESTHESIOLOGY

## 2024-12-03 PROCEDURE — 64635 DESTROY LUMB/SAC FACET JNT: CPT | Performed by: ANESTHESIOLOGY

## 2024-12-03 PROCEDURE — 7100000011 HC PHASE II RECOVERY - ADDTL 15 MIN: Performed by: ANESTHESIOLOGY

## 2024-12-03 PROCEDURE — 99152 MOD SED SAME PHYS/QHP 5/>YRS: CPT | Performed by: ANESTHESIOLOGY

## 2024-12-03 PROCEDURE — 3600000057 HC PAIN LEVEL 4 ADDL 15 MIN: Performed by: ANESTHESIOLOGY

## 2024-12-03 PROCEDURE — 3600000056 HC PAIN LEVEL 4 BASE: Performed by: ANESTHESIOLOGY

## 2024-12-03 PROCEDURE — 81025 URINE PREGNANCY TEST: CPT

## 2024-12-03 PROCEDURE — 6360000002 HC RX W HCPCS: Performed by: ANESTHESIOLOGY

## 2024-12-03 PROCEDURE — 7100000010 HC PHASE II RECOVERY - FIRST 15 MIN: Performed by: ANESTHESIOLOGY

## 2024-12-03 RX ORDER — MIDAZOLAM HYDROCHLORIDE 1 MG/ML
INJECTION, SOLUTION INTRAMUSCULAR; INTRAVENOUS PRN
Status: DISCONTINUED | OUTPATIENT
Start: 2024-12-03 | End: 2024-12-03 | Stop reason: ALTCHOICE

## 2024-12-03 RX ORDER — FENTANYL CITRATE 50 UG/ML
INJECTION, SOLUTION INTRAMUSCULAR; INTRAVENOUS PRN
Status: DISCONTINUED | OUTPATIENT
Start: 2024-12-03 | End: 2024-12-03 | Stop reason: ALTCHOICE

## 2024-12-03 RX ORDER — LIDOCAINE HYDROCHLORIDE 20 MG/ML
INJECTION, SOLUTION EPIDURAL; INFILTRATION; INTRACAUDAL; PERINEURAL PRN
Status: DISCONTINUED | OUTPATIENT
Start: 2024-12-03 | End: 2024-12-03 | Stop reason: ALTCHOICE

## 2024-12-03 RX ORDER — LIDOCAINE HYDROCHLORIDE 10 MG/ML
INJECTION, SOLUTION EPIDURAL; INFILTRATION; INTRACAUDAL; PERINEURAL PRN
Status: DISCONTINUED | OUTPATIENT
Start: 2024-12-03 | End: 2024-12-03 | Stop reason: ALTCHOICE

## 2024-12-03 ASSESSMENT — PAIN - FUNCTIONAL ASSESSMENT
PAIN_FUNCTIONAL_ASSESSMENT: 0-10
PAIN_FUNCTIONAL_ASSESSMENT: 0-10

## 2024-12-03 ASSESSMENT — PAIN DESCRIPTION - DESCRIPTORS: DESCRIPTORS: ACHING

## 2024-12-03 NOTE — PROGRESS NOTES
0929 Received in Phase 2 . Drowsy responsive to verbal stimuli.  Airway patent. O2 sat  94%I Injection site clean and dry.  Denies pain on arrival.  0934 Drink and snack given.  0950 Assisted to sit at side of cart to get dressed  0958 Discharged home via private car without complaint

## 2024-12-03 NOTE — PRE SEDATION
Cholecalciferol (RA VITAMIN D-3 PO) Take by mouth   Yes Randy Chiu MD   omeprazole (PRILOSEC) 20 MG delayed release capsule Take 1 capsule by mouth daily as needed   Yes Randy Chiu MD   Rosuvastatin Calcium (CRESTOR PO) Take 5 mg by mouth   Yes Randy Chiu MD   Levothyroxine Sodium 150 MCG CAPS Take 150 mcg by mouth Daily   Yes Randy Chiu MD   fluticasone-salmeterol (ADVAIR) 250-50 MCG/DOSE AEPB Inhale 1 puff into the lungs daily as needed   Yes Randy Chiu MD   Multiple Vitamins-Minerals (WOMENS ONE DAILY PO) Take 1 tablet by mouth daily.   Yes Randy Chiu MD   ferrous sulfate 325 (65 FE) MG tablet Take 1 tablet by mouth daily (with breakfast)   Yes Randy Chiu MD   FLUoxetine (PROZAC) 20 MG capsule Take 2 capsules by mouth daily   Yes Randy Chiu MD   Omega-3 Fatty Acids (FISH OIL) 1200 MG CAPS Take 1 tablet by mouth daily.   Yes Randy Chiu MD   folic acid (FOLVITE) 400 MCG tablet Take 2 tablets by mouth daily   Yes Randy Chiu MD   ValACYclovir HCl (VALTREX PO) Take 1 tablet by mouth daily as needed.   Yes Randy Chiu MD   Albuterol Sulfate (PROAIR HFA IN) Inhale 2 puffs into the lungs daily as needed.   Yes Randy hCiu MD   gabapentin (NEURONTIN) 300 MG capsule Take 1-2 caps at night 4/18/24 10/15/24  Sofia Nielson PA-C   fluticasone (FLONASE) 50 MCG/ACT nasal spray As needed 6/18/23   Randy Chiu MD   diphenhydrAMINE (BENADRYL) 25 MG tablet Take 1 tablet by mouth every 6 hours as needed for Itching  Patient not taking: Reported on 12/3/2024    Randy Chiu MD   metformin (GLUCOPHAGE) 500 MG tablet Take 2 tablets by mouth 2 times daily (with meals)    Randy Chiu MD     PHYSICAL:   Vitals:    12/03/24 0929   BP: (!) 120/57   Pulse: 70   Resp: 16   Temp: 96.8 °F (36 °C)   SpO2: 94%     PLANNED PROCEDURE   See procedure note  SEDATION  Planned agent:

## 2024-12-03 NOTE — POST SEDATION
[facility]  Sedation/Analgesia Post Sedation Record    Pt Name: Cleo Camacho  MRN: [id number]  YOB: 1975  Procedure Performed By: Aaron Thomas DO  Primary Care Physician: Shani Hale PA-C    POST-PROCEDURE    Physicians/Assistants: Aaron Thomas DO  Procedure Performed: See Procedure Note   Sedation/Anesthesia: Versed and Fentanyl (See procedure note for amount and duration)  Estimated Blood Loss:     0  ml  Specimens Removed: None        Complications: None           Aaron Thomas DO  Electronically signed 12/3/2024 at 10:24 AM

## 2024-12-03 NOTE — PROCEDURES
Pre-operative Diagnosis: Lumbar facet pain     Post-operative Diagnosis: Lumbar facet pain     Procedure: Bilateral lumbar thermal radiofrequency ablation targeting facet joints L4/L5 and L5/S1    Procedure Description:  After consent was obtained, the patient was placed in the prone position with a pillow under the abdomen to reduce the lordotic curve of the lumbar spine. The lower back was prepped with chloraprep and draped in a sterile fashion.  Then, 0.5 cc of 1 % lidocaine was used for local anesthesia of the skin and superficial subcutaneous tissues.  Three 20-gauge 100mm SMK cannulas with 10-mm active tips were advanced under fluoroscopic guidance in an AP view to the junction of the superior articular process and the transverse process of the L4 and L5 vertebra and at the sacral ala. There were no paresthesias, heme or CSF obtained.  Needle placement was confirmed using AP and oblique views. This procedure was repeated on the contralateral side.      Sensory and motor stimulation at 50Hz and 2Hz and impedance measurements were carried out having reached threshold at:     RIGHT  L4: 0.2V/3V/150-300 Ohms  L5: 0.2V/3V/150-300 Ohms  SA: 0.2V/3V/150-300 Ohms     LEFT  L4: 0.2V/3V/150-300 Ohms  L5: 0.2V/3V/150-300 Ohms  SA: 0.2V/3V/150-300 Ohms        Then, 1cc of 2% Lidocaine was injected at the site. Temperature was then raised to 80 degrees centigrade for 90 seconds with a 15 second temperature ramp. No pain was reported during the lesioning. The needles were then withdrawn without complications. The patient tolerated the procedure well. The patient was transported to the recovery room and discharged in ambulatory fashion.    Procedural Complications: None  Estimated Blood Loss: 0 mL    IV sedation was used during the procedure:  - Moderate intravenous conscious sedation was supervised by Dr. Thomas  - The patient was independently monitored by a Registered Nurse assigned to the procedure room  - Monitoring

## 2024-12-10 NOTE — PROGRESS NOTES
[place] [facility]      Cleo Camacho         [id number]  12/11/2024   Chief Complaint   Patient presents with    Follow-up     6 month GROVER        Patient of jassi     Assessment/Plan   1. Obstructive sleep apnea on CPAP    2. RLS (restless legs syndrome)    3. Obesity (BMI 30-39.9)         -Continue gabapentin (NEURONTIN) 300 MG capsule; Take 1-2 caps at night  Dispense: 180 capsule; Refill: 2  - DME Order for CPAP as OP  -Yearly supply order placed? Yes new supplies sent  -Download was personally reviewed and discussed with patient at length.  -The symptoms of GROVER have improved. The patient is benefiting from therapy and should continue use of PAP device.  -Patient's symptoms and AHI are controlled with current settings of 13 cmH2O. Continue current pressure settings.  -Advised to continue current positive airway pressure therapy with above described pressure.   -Advised to keep good compliance with current recommended pressure to get optimal results and clinical improvement  -Recommend 7-9 hours of sleep with PAP  -Instructed to call DME company regarding supplies if needed.   -Patient to call my office for earlier appointment if needed for worsening of sleep symptoms.   -Patient is not to drive if feeling sleepy  -Counseled patient on weight loss    Educated about my impression and plan. Patient verbalizes understanding.      Return in about 1 year (around 12/11/2025) for GROVER. with download.      Subjective   Presentation:   Cleo presents for sleep medicine follow up for obstructive sleep apnea.  Since the last visit, Cleo has been doing very well on PAP therapy and reports good benefit from compliant use of PAP machine. No complaints of problems with machine, mask, or pressures at this time.      RLS is well controlled with gabapentin. She is taking 600 nightly. She reports that this is also helping her fall asleep.     Sleep issues:  Do you feel better? Yes  More

## 2024-12-11 ENCOUNTER — OFFICE VISIT (OUTPATIENT)
Dept: PULMONOLOGY | Age: 49
End: 2024-12-11
Payer: COMMERCIAL

## 2024-12-11 VITALS
HEIGHT: 66 IN | SYSTOLIC BLOOD PRESSURE: 136 MMHG | DIASTOLIC BLOOD PRESSURE: 78 MMHG | HEART RATE: 68 BPM | BODY MASS INDEX: 38.44 KG/M2 | OXYGEN SATURATION: 98 % | TEMPERATURE: 98.7 F | WEIGHT: 239.2 LBS

## 2024-12-11 DIAGNOSIS — G47.33 OBSTRUCTIVE SLEEP APNEA ON CPAP: Primary | ICD-10-CM

## 2024-12-11 DIAGNOSIS — E66.9 OBESITY (BMI 30-39.9): ICD-10-CM

## 2024-12-11 DIAGNOSIS — G25.81 RLS (RESTLESS LEGS SYNDROME): ICD-10-CM

## 2024-12-11 PROCEDURE — 99214 OFFICE O/P EST MOD 30 MIN: CPT

## 2024-12-11 RX ORDER — GABAPENTIN 300 MG/1
CAPSULE ORAL
Qty: 180 CAPSULE | Refills: 2 | Status: SHIPPED | OUTPATIENT
Start: 2024-12-11 | End: 2025-06-09

## 2024-12-11 ASSESSMENT — ENCOUNTER SYMPTOMS
COUGH: 1
SHORTNESS OF BREATH: 0
SINUS PAIN: 0
SORE THROAT: 0
RHINORRHEA: 1
SINUS PRESSURE: 0
WHEEZING: 0

## 2025-01-13 ENCOUNTER — TELEPHONE (OUTPATIENT)
Dept: PHYSICAL MEDICINE AND REHAB | Age: 50
End: 2025-01-13

## 2025-01-13 ENCOUNTER — OFFICE VISIT (OUTPATIENT)
Dept: PHYSICAL MEDICINE AND REHAB | Age: 50
End: 2025-01-13
Payer: COMMERCIAL

## 2025-01-13 VITALS
BODY MASS INDEX: 38.44 KG/M2 | DIASTOLIC BLOOD PRESSURE: 70 MMHG | WEIGHT: 239.2 LBS | SYSTOLIC BLOOD PRESSURE: 128 MMHG | HEIGHT: 66 IN

## 2025-01-13 DIAGNOSIS — M47.816 FACET HYPERTROPHY OF LUMBAR REGION: ICD-10-CM

## 2025-01-13 DIAGNOSIS — M53.3 SI (SACROILIAC) JOINT DYSFUNCTION: ICD-10-CM

## 2025-01-13 DIAGNOSIS — G89.4 CHRONIC PAIN SYNDROME: ICD-10-CM

## 2025-01-13 DIAGNOSIS — M53.3 SACROILIAC JOINT PAIN: ICD-10-CM

## 2025-01-13 DIAGNOSIS — M47.816 LUMBAR SPONDYLOSIS: Primary | ICD-10-CM

## 2025-01-13 DIAGNOSIS — M79.18 MYOFASCIAL PAIN: ICD-10-CM

## 2025-01-13 PROCEDURE — 99214 OFFICE O/P EST MOD 30 MIN: CPT | Performed by: NURSE PRACTITIONER

## 2025-01-13 NOTE — TELEPHONE ENCOUNTER
Patient called stating PT works does not work with her schedule.  She is requesting a physical therapy to Cristiane Grider.

## 2025-01-13 NOTE — PROGRESS NOTES
[facility] PROFESSIONAL SERVS  [facility] NEUROSCIENCE AND REHABILITATION [address]  [facility] [address]  Dept: [phone]  Dept Fax: [phone]  Loc: [phone]    Visit Date: 1/13/2025    Functionality Assessment/Goals Worksheet     On a scale of 0 (Does not Interfere) to 10 (Completely Interferes)     1.  Which number describes how during the past week pain has interfered with       the following:  A.  General Activity:  8  B.  Mood: 6  C.  Walking Ability:  1  D.  Normal Work (Includes both work outside the home and housework):  9  E.  Relations with Other People:   1  F.  Sleep:   4  G.  Enjoyment of Life:   9    2.  Patient Prefers to Take their Pain Medications:     []  On a regular basis   [x]  Only when necessary    []  Does not take pain medications    3.  What are the Patient's Goals/Expectations for Visiting Pain Management?     []  Learn about my pain    [x]  Receive Medication   []  Physical Therapy     []  Treat Depression   [x]  Receive Injections    []  Treat Sleep   []  Deal with Anxiety and Stress   []  Treat Opoid Dependence/Addiction   []  Other:  
basis should only be 4000 mg per day.     Adjuvants:   Patient is to continue Naproxen as needed. Patient is advised to take as prescribed and not take on an empty stomach.    Interventions:   None       Procedure educations:  Discussed with patient about risks with procedure including infection, reaction to medication, increased pain, failure of procedures, worsening of symptoms, or bleeding.    Anticoagulation/NPO Recommendations:   Patient does need to hold any medications prior to the procedure- Naproxen 4 days  Patient does need to be NPO prior to the procedure.  IV ANESTHESIA     Multidisciplinary Pain Management:   In the presence of complex, chronic, and multi-factorial pain, the importance of a multidisciplinary approach to pain management in the patient’s management regimen was emphasized and discussed in great detail.   OBESITY: Patient is advised to seek nutrition consult to help in managing their weight to decrease its impact on pain.   The patient was also advised to continue physical therapy and stretching exercises at home and cognitive behavioral and/or group therapy.     Referrals:  Physical therapy-PT works in [place]    Prescriptions Written This Visit:   Diclofenac 50 mg twice daily    Follow-up:   8 weeks    It was my pleasure to evaluate Cleo Camacho today.  I spent over 35 minutes evaluating this patient, reviewing previous notes and images and completing documentation.       Elsi Burton, CLEMENTE - CNP   Interventional Pain Management/PM&R   [facility] [place]

## 2025-01-17 ENCOUNTER — HOSPITAL ENCOUNTER (OUTPATIENT)
Dept: PHYSICAL THERAPY | Age: 50
Setting detail: THERAPIES SERIES
Discharge: HOME | End: 2025-01-17
Payer: COMMERCIAL

## 2025-01-17 PROCEDURE — 97161 PT EVAL LOW COMPLEX 20 MIN: CPT

## 2025-01-17 PROCEDURE — 97530 THERAPEUTIC ACTIVITIES: CPT

## 2025-01-17 NOTE — PROGRESS NOTES
* PLEASE SIGN, DATE AND TIME CERTIFICATION BELOW AND RETURN TO [facility] OUTPATIENT REHABILITATION (FAX #: [phone]).  ATTEST/CO-SIGN IF ACCESSING VIA [employer].  THANK YOU.**    I certify that I have examined the patient below and determined that Physical Medicine and Rehabilitation service is necessary and that I approve the established plan of care for up to 90 days or as specifically noted.  Attestation, signature or co-signature of physician indicates approval of certification requirements.    ________________________ ____________  Physician Signature   Date   [facility]  PHYSICAL THERAPY  [x] EVALUATION  [] DAILY NOTE (LAND) [] DAILY NOTE (AQUATIC ) [] PROGRESS NOTE [] DISCHARGE NOTE    [] OUTPATIENT REHABILITATION CENTER [facility]   [] [facility] CARE [place]    [] [facility]   [x] [facility]    Date: 2025  Patient Name:  Cleo Camacho  : 1975  MRN: [id number]  CSN: [id number]    Referring Practitioner Elsi Burton, CLEMENTE - CNP [id number]      Diagnosis  Diagnoses       M47.816 (ICD-10-CM) - Spondylosis without myelopathy or radiculopathy, lumbar region    M53.3 (ICD-10-CM) - Sacrococcygeal disorders, not elsewhere classified    M79.18 (ICD-10-CM) - Myalgia, other site    G89.4 (ICD-10-CM) - Chronic pain syndrome           Treatment Diagnosis M54.59, G89.29  Chronic Lower Back Pain  M62.81 Generalized Muscle Weakness  R29.3 Abnormal Posture  R53.1 Weakness  M25.60 Stiffness of Unspecified Joint   Date of Evaluation 25   Additional Pertinent History Cleo Camacho has a past medical history of Asthma, Diabetes mellitus (HCC), Heterozygous MTHFR mutation [id number], Hyperlipidemia, Infertility, female, Polycystic ovarian disease, Sleep apnea, and Thyroid disease.  she has a past surgical history that includes Bunionectomy (Right, );Breast surgery (Left, 2/26/15); and multiple back injections   Allergies Allergies   Allergen Reactions

## 2025-01-24 ENCOUNTER — HOSPITAL ENCOUNTER (OUTPATIENT)
Dept: PHYSICAL THERAPY | Age: 50
Setting detail: THERAPIES SERIES
Discharge: HOME | End: 2025-01-24
Payer: COMMERCIAL

## 2025-01-24 PROCEDURE — 97530 THERAPEUTIC ACTIVITIES: CPT

## 2025-01-24 PROCEDURE — 97110 THERAPEUTIC EXERCISES: CPT

## 2025-01-24 NOTE — PROGRESS NOTES
Modalities, Postural Retraining, and Body Mechanics/Ergonomic Training    [x]  Plan of care initiated.  Plan to see patient 1-2 times per week for 8 weeks to address the treatment planned outlined above.  []  Continue with current plan of care  []  Modify plan of care as follows:    []  Hold pending physician visit  []  Discharge    Time In 0700   Time Out 0745   Timed Code Minutes: 45 min   Total Treatment Time: 45 min       Electronically Signed by: Azar Tena PTA

## 2025-01-31 ENCOUNTER — HOSPITAL ENCOUNTER (OUTPATIENT)
Dept: PHYSICAL THERAPY | Age: 50
Setting detail: THERAPIES SERIES
Discharge: HOME | End: 2025-01-31
Payer: COMMERCIAL

## 2025-01-31 PROCEDURE — 97530 THERAPEUTIC ACTIVITIES: CPT

## 2025-01-31 PROCEDURE — 97110 THERAPEUTIC EXERCISES: CPT

## 2025-01-31 NOTE — PROGRESS NOTES
[facility]  PHYSICAL THERAPY  [] EVALUATION  [x] DAILY NOTE (LAND) [] DAILY NOTE (AQUATIC ) [] PROGRESS NOTE [] DISCHARGE NOTE    [] OUTPATIENT REHABILITATION CENTER [facility]   [] [place] [facility]    [] [facility]   [x] [facility]    Date: 2025  Patient Name:  Cleo Camacho  : 1975  MRN: [id number]  CSN: [id number]    Referring Practitioner Elsi Burton APRN - CNP [phone]      Diagnosis  Diagnoses       M47.816 (ICD-10-CM) - Spondylosis without myelopathy or radiculopathy, lumbar region    M53.3 (ICD-10-CM) - Sacrococcygeal disorders, not elsewhere classified    M79.18 (ICD-10-CM) - Myalgia, other site    G89.4 (ICD-10-CM) - Chronic pain syndrome           Treatment Diagnosis M54.59, G89.29  Chronic Lower Back Pain  M62.81 Generalized Muscle Weakness  R29.3 Abnormal Posture  R53.1 Weakness  M25.60 Stiffness of Unspecified Joint   Date of Evaluation 25   Additional Pertinent History Cleo Camacho has a past medical history of Asthma, Diabetes mellitus (HCC), Heterozygous MTHFR mutation M5132J, Hyperlipidemia, Infertility, female, Polycystic ovarian disease, Sleep apnea, and Thyroid disease.  she has a past surgical history that includes Bunionectomy (Right, );Breast surgery (Left, 2/26/15); and multiple back injections   Allergies Allergies   Allergen Reactions    Molds & Smuts      Through allergy testing    Penicillins Other (See Comments)     Unsure of reaction-? Really allergic      Medications   Current Outpatient Medications:     diclofenac (VOLTAREN)     gabapentin (NEURONTIN)     metoprolol tartrate (LOPRESSOR)     Cyanocobalamin (B-12 PO)    Cholecalciferol (RA VITAMIN D-3 PO)    omeprazole (PRILOSEC)    Rosuvastatin Calcium (CRESTOR PO),     fluticasone-salmeterol (ADVAIR)    metformin (GLUCOPHAGE)     ferrous sulfate 325 (65 FE)     FLUoxetine (PROZAC)     folic acid (FOLVITE)     ValACYclovir HCl (VALTREX PO),

## 2025-02-07 ENCOUNTER — HOSPITAL ENCOUNTER (OUTPATIENT)
Dept: PHYSICAL THERAPY | Age: 50
Setting detail: THERAPIES SERIES
Discharge: HOME | End: 2025-02-07
Payer: COMMERCIAL

## 2025-02-07 PROCEDURE — 97110 THERAPEUTIC EXERCISES: CPT

## 2025-02-07 PROCEDURE — 97530 THERAPEUTIC ACTIVITIES: CPT

## 2025-02-07 NOTE — PROGRESS NOTES
[facility]  PHYSICAL THERAPY  [] EVALUATION  [x] DAILY NOTE (LAND) [] DAILY NOTE (AQUATIC ) [] PROGRESS NOTE [] DISCHARGE NOTE    [] OUTPATIENT REHABILITATION CENTER [facility]   [] [place] [facility]    [] [facility]   [x] [facility]    Date: 2025  Patient Name:  Cleo Camacho  : 1975  MRN: [id number]  CSN: [id number]    Referring Practitioner Elsi Burton APRN - CNP [phone]      Diagnosis  Diagnoses       M47.816 (ICD-10-CM) - Spondylosis without myelopathy or radiculopathy, lumbar region    M53.3 (ICD-10-CM) - Sacrococcygeal disorders, not elsewhere classified    M79.18 (ICD-10-CM) - Myalgia, other site    G89.4 (ICD-10-CM) - Chronic pain syndrome           Treatment Diagnosis M54.59, G89.29  Chronic Lower Back Pain  M62.81 Generalized Muscle Weakness  R29.3 Abnormal Posture  R53.1 Weakness  M25.60 Stiffness of Unspecified Joint   Date of Evaluation 25   Additional Pertinent History Cleo Camacho has a past medical history of Asthma, Diabetes mellitus (HCC), Heterozygous MTHFR mutation O5765X, Hyperlipidemia, Infertility, female, Polycystic ovarian disease, Sleep apnea, and Thyroid disease.  she has a past surgical history that includes Bunionectomy (Right, );Breast surgery (Left, 2/26/15); and multiple back injections   Allergies Allergies   Allergen Reactions    Molds & Smuts      Through allergy testing    Penicillins Other (See Comments)     Unsure of reaction-? Really allergic      Medications   Current Outpatient Medications:     diclofenac (VOLTAREN)     gabapentin (NEURONTIN)     metoprolol tartrate (LOPRESSOR)     Cyanocobalamin (B-12 PO)    Cholecalciferol (RA VITAMIN D-3 PO)    omeprazole (PRILOSEC)    Rosuvastatin Calcium (CRESTOR PO),     fluticasone-salmeterol (ADVAIR)    metformin (GLUCOPHAGE)     ferrous sulfate 325 (65 FE)     FLUoxetine (PROZAC)     folic acid (FOLVITE)     ValACYclovir HCl (VALTREX PO),

## 2025-02-14 ENCOUNTER — HOSPITAL ENCOUNTER (OUTPATIENT)
Dept: PHYSICAL THERAPY | Age: 50
Setting detail: THERAPIES SERIES
Discharge: HOME | End: 2025-02-14
Payer: COMMERCIAL

## 2025-02-14 PROCEDURE — 97110 THERAPEUTIC EXERCISES: CPT

## 2025-02-14 PROCEDURE — 97530 THERAPEUTIC ACTIVITIES: CPT

## 2025-02-14 NOTE — PROGRESS NOTES
[facility]  PHYSICAL THERAPY  [] EVALUATION  [x] DAILY NOTE (LAND) [] DAILY NOTE (AQUATIC ) [] PROGRESS NOTE [] DISCHARGE NOTE    [] OUTPATIENT REHABILITATION CENTER [facility]   [] [place] [facility]ULATORY CARE CENTER    [] [facility]   [x] [facility]    Date: 2025  Patient Name:  Cleo Camacho  : 1975  MRN: [id number]  CSN: [id number]    Referring Practitioner Elsi Burton APRN - CNP [id number]      Diagnosis  Diagnoses       M47.816 (ICD-10-CM) - Spondylosis without myelopathy or radiculopathy, lumbar region    M53.3 (ICD-10-CM) - Sacrococcygeal disorders, not elsewhere classified    M79.18 (ICD-10-CM) - Myalgia, other site    G89.4 (ICD-10-CM) - Chronic pain syndrome           Treatment Diagnosis M54.59, G89.29  Chronic Lower Back Pain  M62.81 Generalized Muscle Weakness  R29.3 Abnormal Posture  R53.1 Weakness  M25.60 Stiffness of Unspecified Joint   Date of Evaluation 25   Additional Pertinent History Cleo Camacho has a past medical history of Asthma, Diabetes mellitus (HCC), Heterozygous MTHFR mutation Q5552Q, Hyperlipidemia, Infertility, female, Polycystic ovarian disease, Sleep apnea, and Thyroid disease.  she has a past surgical history that includes Bunionectomy (Right, );Breast surgery (Left, 2/26/15); and multiple back injections   Allergies Allergies   Allergen Reactions    Molds & Smuts      Through allergy testing    Penicillins Other (See Comments)     Unsure of reaction-? Really allergic      Medications   Current Outpatient Medications:     diclofenac (VOLTAREN)     gabapentin (NEURONTIN)     metoprolol tartrate (LOPRESSOR)     Cyanocobalamin (B-12 PO)    Cholecalciferol (RA VITAMIN D-3 PO)    omeprazole (PRILOSEC)    Rosuvastatin Calcium (CRESTOR PO),     fluticasone-salmeterol (ADVAIR)    metformin (GLUCOPHAGE)     ferrous sulfate 325 (65 FE)     FLUoxetine (PROZAC)     folic acid (FOLVITE)     ValACYclovir HCl (VALTREX PO),

## 2025-02-21 ENCOUNTER — HOSPITAL ENCOUNTER (OUTPATIENT)
Dept: PHYSICAL THERAPY | Age: 50
Setting detail: THERAPIES SERIES
Discharge: HOME | End: 2025-02-21
Payer: COMMERCIAL

## 2025-02-21 PROCEDURE — 97530 THERAPEUTIC ACTIVITIES: CPT

## 2025-02-21 PROCEDURE — 97110 THERAPEUTIC EXERCISES: CPT

## 2025-02-21 NOTE — PROGRESS NOTES
[facility]  PHYSICAL THERAPY  [] EVALUATION  [x] DAILY NOTE (LAND) [] DAILY NOTE (AQUATIC ) [] PROGRESS NOTE [] DISCHARGE NOTE    [] OUTPATIENT REHABILITATION CENTER [facility]   [] [place] AM[facility]ULATORY CARE CENTER    [] [facility]   [x] [facility]    Date: 2025  Patient Name:  Cleo Camacho  : 1975  MRN: [id number]  CSN: [id number]    Referring Practitioner Elsi Burton, CLEMENTE - CNP [phone]      Diagnosis  Diagnoses       M47.816 (ICD-10-CM) - Spondylosis without myelopathy or radiculopathy, lumbar region    M53.3 (ICD-10-CM) - Sacrococcygeal disorders, not elsewhere classified    M79.18 (ICD-10-CM) - Myalgia, other site    G89.4 (ICD-10-CM) - Chronic pain syndrome           Treatment Diagnosis M54.59, G89.29  Chronic Lower Back Pain  M62.81 Generalized Muscle Weakness  R29.3 Abnormal Posture  R53.1 Weakness  M25.60 Stiffness of Unspecified Joint   Date of Evaluation 25   Additional Pertinent History Cleo Camacho has a past medical history of Asthma, Diabetes mellitus (HCC), Heterozygous MTHFR mutation K3342T, Hyperlipidemia, Infertility, female, Polycystic ovarian disease, Sleep apnea, and Thyroid disease.  she has a past surgical history that includes Bunionectomy (Right, );Breast surgery (Left, 2/26/15); and multiple back injections   Allergies Allergies   Allergen Reactions    Molds & Smuts      Through allergy testing    Penicillins Other (See Comments)     Unsure of reaction-? Really allergic      Medications   Current Outpatient Medications:     diclofenac (VOLTAREN)     gabapentin (NEURONTIN)     metoprolol tartrate (LOPRESSOR)     Cyanocobalamin (B-12 PO)    Cholecalciferol (RA VITAMIN D-3 PO)    omeprazole (PRILOSEC)    Rosuvastatin Calcium (CRESTOR PO),     fluticasone-salmeterol (ADVAIR)    metformin (GLUCOPHAGE)     ferrous sulfate 325 (65 FE)     FLUoxetine (PROZAC)     folic acid (FOLVITE)     ValACYclovir HCl (VALTREX PO),

## 2025-03-07 ENCOUNTER — HOSPITAL ENCOUNTER (OUTPATIENT)
Dept: PHYSICAL THERAPY | Age: 50
Setting detail: THERAPIES SERIES
Discharge: HOME | End: 2025-03-07
Payer: COMMERCIAL

## 2025-03-07 PROCEDURE — 97140 MANUAL THERAPY 1/> REGIONS: CPT

## 2025-03-07 PROCEDURE — 97110 THERAPEUTIC EXERCISES: CPT

## 2025-03-07 PROCEDURE — 97530 THERAPEUTIC ACTIVITIES: CPT

## 2025-03-07 NOTE — PROGRESS NOTES
and TrA engagement with lifting up to 20 lbs in order to be able to lift objects around the house when cooking or cleaning with minimal pain.   6. Patient will report improved standing tolerance to 1 hour in order to be able to cook without rest breaks.       Patient Education:   [x]  HEP/Education Completed: continue HEP, Advised pt to monitor her response to new manual therapy and to use her heating pad at work today.  [employer] Access Code: [email or web address]   []  No new Education completed  []  Reviewed Prior HEP      [x]  Patient verbalized and/or demonstrated understanding of education provided.  []  Patient unable to verbalize and/or demonstrate understanding of education provided.  Will continue education.  []  Barriers to learning:     PLAN:  Treatment Recommendations: Strengthening, Range of Motion, Neuromuscular Re-education, Manual Therapy - Soft Tissue Mobilization, Pain Management, Home Exercise Program, Patient Education, Safety Education and Training, Modalities, Postural Retraining, and Body Mechanics/Ergonomic Training    []  Plan of care initiated.  Plan to see patient 1-2 times per week for 8 weeks to address the treatment planned outlined above.  [x]  Continue with current plan of care  []  Modify plan of care as follows:    []  Hold pending physician visit  []  Discharge    Time In 0702   Time Out 0745   Timed Code Minutes: 43 min   Total Treatment Time: 43 min       Electronically Signed by: Jonnathan Kamara PT

## 2025-03-11 ENCOUNTER — OFFICE VISIT (OUTPATIENT)
Dept: PHYSICAL MEDICINE AND REHAB | Age: 50
End: 2025-03-11
Payer: COMMERCIAL

## 2025-03-11 VITALS
HEIGHT: 66 IN | SYSTOLIC BLOOD PRESSURE: 126 MMHG | BODY MASS INDEX: 38.44 KG/M2 | WEIGHT: 239.2 LBS | DIASTOLIC BLOOD PRESSURE: 72 MMHG

## 2025-03-11 DIAGNOSIS — G89.4 CHRONIC PAIN SYNDROME: ICD-10-CM

## 2025-03-11 DIAGNOSIS — M53.3 SI (SACROILIAC) JOINT DYSFUNCTION: ICD-10-CM

## 2025-03-11 DIAGNOSIS — M79.18 MYOFASCIAL PAIN: ICD-10-CM

## 2025-03-11 DIAGNOSIS — M47.816 LUMBAR SPONDYLOSIS: Primary | ICD-10-CM

## 2025-03-11 DIAGNOSIS — M47.816 FACET HYPERTROPHY OF LUMBAR REGION: ICD-10-CM

## 2025-03-11 DIAGNOSIS — M53.3 SACROILIAC JOINT PAIN: ICD-10-CM

## 2025-03-11 PROCEDURE — 99214 OFFICE O/P EST MOD 30 MIN: CPT | Performed by: NURSE PRACTITIONER

## 2025-03-11 NOTE — PROGRESS NOTES
Chronic Pain/PM&R Clinic Note     Encounter Date: 3/11/25    Subjective:   Chief Complaint:   Chief Complaint   Patient presents with    Follow-up     8 week f/u      History of Present Illness:   Cleo Camacho is a 49 y.o. female seen in the clinic initially on 09/26/2022 upon request from Dr Aguila for her history of SI, low back pain. She has a personal medical history of GR[place], asthma, obesity, pre-diabetic, anxiety, depression.     Patient presents with complaints of bilateral low back pain, right is the worst, does go into the buttocks and occasionally does have some thigh pain. She denies pain going into the legs or feet. She describes the pain as a constant dull ache. She reports pain for 18 + months. She fell on the ice in 2021, no fractures that she knew of, and no significant back pain at that time. Then she went to [place], walked a lot, and then noticed the back pain. She has tried chiropractor, massage, physical therapy with relief, OTCs, topicals with mild relief. Pain is increased walking, standing, up.down stairs, change in position, sitting to long. Pain is alleviated by heat, naproxen. Mornings are the worst, she feels stiff but pain does continue throughout the day. She tries to do exercises at home, but minimally. 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 Denies any numbness, tingling, loss of bowel or bladder. She reports [facility] did give her a intramuscular injection with no relief. Pain scale : at worst pain is 9/10, at best pain is 2/10.     Today, 2/13/2024, patient presents for planned follow-up.  She states that she feels like she is still getting significant relief from her injections that were completed in November.  She states she will have occasional flareup of pain, but otherwise feels like it is still well-controlled.  She states that she has been seeing a chiropractor once every 3 weeks which is helping maintain her pain.  She

## 2025-03-11 NOTE — PROGRESS NOTES
[facility] PROFESSIONAL SERVS  [facility] NEUROSCIENCE AND REHABILITATION [address]  [facility] [address]  Dept: [phone]  Dept Fax: [phone]  Loc: [phone]    Visit Date: 3/11/2025    Functionality Assessment/Goals Worksheet     On a scale of 0 (Does not Interfere) to 10 (Completely Interferes)     1.  Which number describes how during the past week pain has interfered with       the following:  A.  General Activity:  3  B.  Mood: 1  C.  Walking Ability:  1  D.  Normal Work (Includes both work outside the home and housework):  2  E.  Relations with Other People:   1  F.  Sleep:   1  G.  Enjoyment of Life:   2    2.  Patient Prefers to Take their Pain Medications:     []  On a regular basis   [x]  Only when necessary    []  Does not take pain medications    3.  What are the Patient's Goals/Expectations for Visiting Pain Management?     [x]  Learn about my pain    []  Receive Medication   []  Physical Therapy     []  Treat Depression   []  Receive Injections    []  Treat Sleep   []  Deal with Anxiety and Stress   []  Treat Opoid Dependence/Addiction   []  Other:

## 2025-03-14 ENCOUNTER — HOSPITAL ENCOUNTER (OUTPATIENT)
Dept: PHYSICAL THERAPY | Age: 50
Setting detail: THERAPIES SERIES
Discharge: HOME | End: 2025-03-14
Payer: COMMERCIAL

## 2025-03-14 PROCEDURE — 97530 THERAPEUTIC ACTIVITIES: CPT

## 2025-03-14 NOTE — DISCHARGE SUMMARY
better since starting therapy. She feels confident to continue to manage her symptoms with HEP outside of therapy.       Objective:  TREATMENT   Precautions: Hard of hearing    Pain:2/10 LB    \"X” in shaded column indicates activity completed today    “*\" next to exercise/intervention indicates progression   Modalities Parameters/  Location  Notes   MHP LB   x10 min  During supine therex                 Manual Therapy Time/Technique  Notes   Cupping  During prone exercises   4 cups total (2 on each side of lumbar paraspinals)   Slight redness observed afterwards    Hawkgrips  10 min  Prone, along bilateral lumbar paraspinals and B iliac crest          Exercise/Intervention   Notes   Nustep X5 min   Load 5, arms 8, seat 10          Quad sets x10 5 sec     Glute sets x10 5 sec  Added small hip extension    Abdominal sets x10 5 sec     Hip abduction iso with belt x10 5 sec     Hip Adduction iso with ball x10 5 sec     Resisted hip abduction with T band x10 [place]   Alternating LEs   LTR x10  x    Bridges, staggered stance (x8) x12  x With orange band    Seated hip IR with resistance  10x2  x    Benjamin pose 3 x 15 sec      Hip ER/IR AROM in prone  x10   Prone knees flexed to 90 deg (added cups to    Quadruped arm lifts, and leg lift X10  x    Cat/cow 10x  x Added breath work    Prone hamstring curls  10x      Benjamin pose  30sx2      Prone hip extension  10x   Difficult           Seated hamstring stretch  2x30 sec   Completed in supine with strap    Seated Piriformis stretch 2 x 30 sec   Completed in supine    Modified Ricki 1 min   x    SKTC 2x30s  x                         Banded walk        Pallof press  2x8 ea side  x Orange theraband tied to // bars    3 way hip 8x [place]  x    Standing counter L stretch  30s      Lifting techniques  5 min  x    [place] carry  30 ft x 4 10 lb KB  Unilateral x2   Bilateral x2      Specific Interventions Next Treatment: manual therapy to lumbar paraspinals (cupping, hawkgrips), hip

## 2025-03-19 ENCOUNTER — TRANSCRIBE ORDERS (OUTPATIENT)
Dept: ADMINISTRATIVE | Age: 50
End: 2025-03-19

## 2025-03-19 DIAGNOSIS — Z12.31 ENCOUNTER FOR SCREENING MAMMOGRAM FOR MALIGNANT NEOPLASM OF BREAST: Primary | ICD-10-CM

## 2025-04-07 ENCOUNTER — HOSPITAL ENCOUNTER (OUTPATIENT)
Dept: MAMMOGRAPHY | Age: 50
Discharge: HOME | End: 2025-04-07
Attending: STUDENT IN AN ORGANIZED HEALTH CARE EDUCATION/TRAINING PROGRAM
Payer: COMMERCIAL

## 2025-04-07 VITALS — BODY MASS INDEX: 37.28 KG/M2 | HEIGHT: 66 IN | WEIGHT: 232 LBS

## 2025-04-07 DIAGNOSIS — Z12.31 ENCOUNTER FOR SCREENING MAMMOGRAM FOR MALIGNANT NEOPLASM OF BREAST: ICD-10-CM

## 2025-04-07 PROCEDURE — 77063 BREAST TOMOSYNTHESIS BI: CPT

## 2025-05-13 ENCOUNTER — OFFICE VISIT (OUTPATIENT)
Dept: PHYSICAL MEDICINE AND REHAB | Age: 50
End: 2025-05-13
Payer: COMMERCIAL

## 2025-05-13 VITALS
WEIGHT: 231.92 LBS | HEIGHT: 66 IN | SYSTOLIC BLOOD PRESSURE: 112 MMHG | BODY MASS INDEX: 37.27 KG/M2 | DIASTOLIC BLOOD PRESSURE: 72 MMHG

## 2025-05-13 DIAGNOSIS — M53.3 SACROILIAC JOINT PAIN: ICD-10-CM

## 2025-05-13 DIAGNOSIS — M47.816 FACET HYPERTROPHY OF LUMBAR REGION: ICD-10-CM

## 2025-05-13 DIAGNOSIS — M79.18 MYOFASCIAL PAIN: ICD-10-CM

## 2025-05-13 DIAGNOSIS — M53.3 SI (SACROILIAC) JOINT DYSFUNCTION: ICD-10-CM

## 2025-05-13 DIAGNOSIS — M47.816 LUMBAR SPONDYLOSIS: Primary | ICD-10-CM

## 2025-05-13 DIAGNOSIS — G89.4 CHRONIC PAIN SYNDROME: ICD-10-CM

## 2025-05-13 PROCEDURE — 99214 OFFICE O/P EST MOD 30 MIN: CPT | Performed by: NURSE PRACTITIONER

## 2025-05-13 NOTE — PROGRESS NOTES
Functionality Assessment/Goals Worksheet     On a scale of 0 (Does not Interfere) to 10 (Completely Interferes)     1.  Which number describes how during the past week pain has interfered with           the following:  A.  General Activity:  4  B.  Mood: 4  C.  Walking Ability:  4  D.  Normal Work (Includes both work outside the home and housework):  8  E.  Relations with Other People:   4  F.  Sleep:   2  G.  Enjoyment of Life:   5    2.  Patient Prefers to Take their Pain Medications:     []  On a regular basis   [x]  Only when necessary    []  Does not take pain medications    3.  What are the Patient's Goals/Expectations for Visiting Pain Management?     []  Learn about my pain    []  Receive Medication   []  Physical Therapy     []  Treat Depression   []  Receive Injections    []  Treat Sleep   []  Deal with Anxiety and Stress   []  Treat Opoid Dependence/Addiction   [x]  Other:                                
changes while stopping at all.  I did send this to her pharmacy for 90-day supply for her mail-in pharmacy.  I did discuss if she does have any other issues, she is to stop and let me know we can Jhoan resume naproxen in the future if needed.  I have encouraged her with good supportive shoes, insoles as she is starting to walk more, and to continue working with core and back strengthening as well.  I also encouraged her on topicals, ice, heat, over-the-counter's.  I would like to see her back in 8 weeks to reevaluate.    I did discuss with her the SI injection she has been receiving did give her significant benefit from 6-7 months at a time, which is an longer term relief than normal.  I did discuss with her that with his good relief, and only having to do them once to twice a year, the only other option we would have would be advanced SI injections to see if this gives her longer relief, or seeing a surgeon for potential fusion.  I did discuss with her she does need to be working with her exercises daily to help prevent flareups, but the flareups can occur based off of what she is doing, weather changes.  I also did discuss with her utilizing the anti-inflammatories on a regular basis to help prevent this as well, and if she is asking to go back to her naproxen, have stopped her diclofenac and started her back on naproxen 500 mg twice daily.  We have also discussed utilizing over-the-counter's, topicals, ice, heat.  As she is wanting to move forward with the MRIs that were discussed at previous visits that have gone ahead and ordered a lumbar MRI, as well as a pelvic MRI to discuss and review further at her next visit.  Will go ahead and schedule her next visit in 6 weeks, after her MRIs are completed to reevaluate and discuss further.      Plan:   The following treatment recommendations and plan were discussed in detail with Cleo Camacho.    Imaging:   I have reviewed patient’s imaging of lumbar XR, Lumbar MRI

## 2025-05-22 ENCOUNTER — TELEPHONE (OUTPATIENT)
Dept: PHYSICAL MEDICINE AND REHAB | Age: 50
End: 2025-05-22

## 2025-05-22 DIAGNOSIS — M53.3 SACROILIAC JOINT PAIN: ICD-10-CM

## 2025-05-22 DIAGNOSIS — M53.3 SI (SACROILIAC) JOINT DYSFUNCTION: Primary | ICD-10-CM

## 2025-05-22 NOTE — TELEPHONE ENCOUNTER
Patient notified, and she will be getting x-ray completed at [facility].

## 2025-05-22 NOTE — TELEPHONE ENCOUNTER
Let patient know its denied, and we need XR of sacrum first. Ordered in this encounter and we can discuss at her follow up

## 2025-05-22 NOTE — TELEPHONE ENCOUNTER
Patient's MRI Sacrum needs additional information.  In order to process this case, please provide current evaluation which include detailed medical history and physical examination, appropriate laboratory studies and non-advanced imaging modalities related to this request.  Call [facility] at [phone], select Option 4 and enter case ID [id number] to speak with a clinician about this request.

## 2025-05-29 ENCOUNTER — HOSPITAL ENCOUNTER (OUTPATIENT)
Dept: GENERAL RADIOLOGY | Age: 50
Discharge: HOME | End: 2025-05-29
Payer: COMMERCIAL

## 2025-05-29 ENCOUNTER — HOSPITAL ENCOUNTER (OUTPATIENT)
Age: 50
Discharge: HOME | End: 2025-05-29
Payer: COMMERCIAL

## 2025-05-29 DIAGNOSIS — M53.3 SACROILIAC JOINT PAIN: ICD-10-CM

## 2025-05-29 DIAGNOSIS — M53.3 SI (SACROILIAC) JOINT DYSFUNCTION: ICD-10-CM

## 2025-05-29 PROCEDURE — 72220 X-RAY EXAM SACRUM TAILBONE: CPT

## 2025-05-30 ENCOUNTER — RESULTS FOLLOW-UP (OUTPATIENT)
Dept: PHYSICAL MEDICINE AND REHAB | Age: 50
End: 2025-05-30

## 2025-06-07 ENCOUNTER — APPOINTMENT (OUTPATIENT)
Dept: MRI IMAGING | Age: 50
End: 2025-06-07
Payer: COMMERCIAL

## 2025-06-07 ENCOUNTER — HOSPITAL ENCOUNTER (OUTPATIENT)
Dept: MRI IMAGING | Age: 50
Discharge: HOME | End: 2025-06-07
Payer: COMMERCIAL

## 2025-06-07 DIAGNOSIS — M47.816 FACET HYPERTROPHY OF LUMBAR REGION: ICD-10-CM

## 2025-06-07 DIAGNOSIS — M47.816 LUMBAR SPONDYLOSIS: ICD-10-CM

## 2025-06-07 PROCEDURE — 72148 MRI LUMBAR SPINE W/O DYE: CPT

## 2025-06-09 ENCOUNTER — RESULTS FOLLOW-UP (OUTPATIENT)
Dept: PHYSICAL MEDICINE AND REHAB | Age: 50
End: 2025-06-09

## 2025-06-25 ENCOUNTER — OFFICE VISIT (OUTPATIENT)
Dept: PHYSICAL MEDICINE AND REHAB | Age: 50
End: 2025-06-25
Payer: COMMERCIAL

## 2025-06-25 VITALS
WEIGHT: 231.92 LBS | HEIGHT: 66 IN | DIASTOLIC BLOOD PRESSURE: 78 MMHG | SYSTOLIC BLOOD PRESSURE: 128 MMHG | BODY MASS INDEX: 37.27 KG/M2

## 2025-06-25 DIAGNOSIS — M79.18 MYOFASCIAL PAIN: ICD-10-CM

## 2025-06-25 DIAGNOSIS — M53.3 SI (SACROILIAC) JOINT DYSFUNCTION: Primary | ICD-10-CM

## 2025-06-25 DIAGNOSIS — G89.4 CHRONIC PAIN SYNDROME: ICD-10-CM

## 2025-06-25 DIAGNOSIS — M53.3 SACROILIAC JOINT PAIN: ICD-10-CM

## 2025-06-25 DIAGNOSIS — M47.816 LUMBAR SPONDYLOSIS: ICD-10-CM

## 2025-06-25 DIAGNOSIS — M47.816 FACET HYPERTROPHY OF LUMBAR REGION: ICD-10-CM

## 2025-06-25 PROCEDURE — 99215 OFFICE O/P EST HI 40 MIN: CPT | Performed by: NURSE PRACTITIONER

## 2025-06-25 NOTE — PROGRESS NOTES
[facility] PROFESSIONAL SERVS  [facility] NEUROSCIENCE AND REHABILITATION [address]  [facility] [address]  Dept: [phone]  Dept Fax: [phone]  Loc: [phone]    Visit Date: 6/25/2025    Functionality Assessment/Goals Worksheet     On a scale of 0 (Does not Interfere) to 10 (Completely Interferes)     1.  Which number describes how during the past week pain has interfered with       the following:  A.  General Activity:  2  B.  Mood: 2  C.  Walking Ability:  3  D.  Normal Work (Includes both work outside the home and housework):  4  E.  Relations with Other People:   2  F.  Sleep:   2  G.  Enjoyment of Life:   2    2.  Patient Prefers to Take their Pain Medications:     []  On a regular basis   [x]  Only when necessary    []  Does not take pain medications    3.  What are the Patient's Goals/Expectations for Visiting Pain Management?     [x]  Learn about my pain    []  Receive Medication   []  Physical Therapy     []  Treat Depression   []  Receive Injections    []  Treat Sleep   []  Deal with Anxiety and Stress   []  Treat Opoid Dependence/Addiction   []  Other:

## 2025-06-25 NOTE — PROGRESS NOTES
Chronic Pain/PM&R Clinic Note     Encounter Date: 6/25/25    Subjective:   Chief Complaint:   Chief Complaint   Patient presents with    Follow-up     6 week f/u, go over recent MRI      History of Present Illness:   Cleo Camacho is a 49 y.o. female seen in the clinic initially on 09/26/2022 upon request from Dr Aguila for her history of SI, low back pain. She has a personal medical history of GROVER, asthma, obesity, pre-diabetic, anxiety, depression.     Patient presents with complaints of bilateral low back pain, right is the worst, does go into the buttocks and occasionally does have some thigh pain. She denies pain going into the legs or feet. She describes the pain as a constant dull ache. She reports pain for 18 + months. She fell on the ice in 2021, no fractures that she knew of, and no significant back pain at that time. Then she went to [place], walked a lot, and then noticed the back pain. She has tried chiropractor, massage, physical therapy with relief, OTCs, topicals with mild relief. Pain is increased walking, standing, up.down stairs, change in position, sitting to long. Pain is alleviated by heat, naproxen. Mornings are the worst, she feels stiff but pain does continue throughout the day. She tries to do exercises at home, but minimally. 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 Denies any numbness, tingling, loss of bowel or bladder. She reports [facility] did give her a intramuscular injection with no relief. Pain scale : at worst pain is 9/10, at best pain is 2/10.       Today, 3/11/2025, patient presents for planned follow-up.  She states that she has been working with therapy, and has been doing the exercises on her own at home as well.  She states it has been going very well, and she definitely feels like it is helping, she is feeling stronger.  She states she feels like it is helping her pain as well.  She states she is has flareups

## (undated) DEVICE — GAUZE SPONGES,USP TYPE VII GAUZE, 12 PLY: Brand: CURITY

## (undated) DEVICE — TOWEL,OR,DSP,ST,BLUE,STD,4/PK,20PK/CS: Brand: MEDLINE

## (undated) DEVICE — NEEDLE SPNL 22GA L3.5IN BLK HUB S STL REG WALL FIT STYL W/

## (undated) DEVICE — GLOVE BIOGEL POWDER FREE SZ 8

## (undated) DEVICE — SC PAIN PACK: Brand: MEDLINE INDUSTRIES, INC.

## (undated) DEVICE — NEEDLE SPNL 22GA L3.5IN BLK HUB S STL REG WALL FIT STYL

## (undated) DEVICE — SYRINGE MEDICAL 3ML CLEAR PLASTIC STANDARD NON CONTROL LUERLOCK TIP DISPOSABLE

## (undated) DEVICE — 1840 FOAM BLOCK NEEDLE COUNTER: Brand: DEVON

## (undated) DEVICE — APPLICATOR MEDICATED 3 CC SOLUTION CLR STRL CHLORAPREP

## (undated) DEVICE — GLOVE ORANGE PI 8 1/2   MSG9085

## (undated) DEVICE — HYPODERMIC SAFETY NEEDLE: Brand: MAGELLAN

## (undated) DEVICE — APPLICATOR MEDICATED 26 CC SOLUTION HI LT ORNG CHLORAPREP

## (undated) DEVICE — MARKER,SKIN,WI/RULER AND LABELS: Brand: MEDLINE

## (undated) DEVICE — 6 ML SYRINGE LUER-LOCK TIP: Brand: MONOJECT

## (undated) DEVICE — NEEDLE HYPO 18GA L1.5IN THN WALL PIVOTING SHLD BVL ORIENTED

## (undated) DEVICE — SYRINGE MED 10ML LUERLOCK TIP W/O SFTY DISP

## (undated) DEVICE — 3 ML SYRINGE LUER-LOCK TIP: Brand: MONOJECT

## (undated) DEVICE — SYRINGE MED 3ML CLR PLAS STD N CTRL LUERLOCK TIP DISP

## (undated) DEVICE — SYRINGE MED 5ML STD CLR PLAS LUERLOCK TIP N CTRL DISP

## (undated) DEVICE — Z DISCONTINUED NEEDLE HYPO 18GA L1.5IN THN WALL PIVOTING SHLD BVL ORIENTED

## (undated) DEVICE — APPLICATOR MEDICATED 10.5 CC SOLUTION CLR STRL CHLORAPREP